# Patient Record
Sex: MALE | Race: WHITE | NOT HISPANIC OR LATINO | Employment: OTHER | URBAN - METROPOLITAN AREA
[De-identification: names, ages, dates, MRNs, and addresses within clinical notes are randomized per-mention and may not be internally consistent; named-entity substitution may affect disease eponyms.]

---

## 2018-04-03 ENCOUNTER — OFFICE VISIT (OUTPATIENT)
Dept: ORTHOPEDICS | Facility: CLINIC | Age: 68
End: 2018-04-03
Payer: MEDICARE

## 2018-04-03 VITALS
BODY MASS INDEX: 21.43 KG/M2 | HEART RATE: 90 BPM | WEIGHT: 167 LBS | DIASTOLIC BLOOD PRESSURE: 78 MMHG | SYSTOLIC BLOOD PRESSURE: 110 MMHG | HEIGHT: 74 IN

## 2018-04-03 DIAGNOSIS — Z96.652 STATUS POST TOTAL LEFT KNEE REPLACEMENT: Primary | ICD-10-CM

## 2018-04-03 PROCEDURE — 99212 OFFICE O/P EST SF 10 MIN: CPT | Mod: ,,, | Performed by: ORTHOPAEDIC SURGERY

## 2018-04-03 RX ORDER — TAMSULOSIN HYDROCHLORIDE 0.4 MG/1
1 CAPSULE ORAL NIGHTLY
COMMUNITY
Start: 2018-03-24

## 2018-04-03 RX ORDER — FINASTERIDE 5 MG/1
1 TABLET, FILM COATED ORAL NIGHTLY
COMMUNITY
Start: 2018-01-15

## 2018-04-03 RX ORDER — VENLAFAXINE HYDROCHLORIDE 150 MG/1
1 CAPSULE, EXTENDED RELEASE ORAL DAILY
COMMUNITY
Start: 2018-01-05

## 2018-04-03 RX ORDER — ROSUVASTATIN CALCIUM 10 MG/1
1 TABLET, COATED ORAL DAILY
COMMUNITY
Start: 2018-02-24

## 2018-04-03 NOTE — PROGRESS NOTES
Subjective:       Chief Complaint    Chief Complaint   Patient presents with    Follow-up     left knee.  DOS: 11/10/14, left TKA.  Patient reports no problems at this time.  One morning he over stretched his legs by accident but everything was ok.  He was questions about the implants in both of his knees due to the lawsuit commericals on T.V. about them coming dislodged.       HPI  Tal Vela is a 67 y.o.  male who presents Follow-up left total knee arthroplasty.      Past History  Past Medical History:   Diagnosis Date    BPH (benign prostatic hyperplasia)     Chondrocalcinosis of knee     Degenerative arthritis of left knee     Degenerative arthritis of left wrist     Soft tissue tumor     Tenosynovitis, wrist      Past Surgical History:   Procedure Laterality Date    ELBOW SURGERY      pinning (in high school)    TOTAL KNEE ARTHROPLASTY Left 11/10/2014    TOTAL KNEE ARTHROPLASTY Right 2006     Social History     Social History    Marital status:      Spouse name: N/A    Number of children: N/A    Years of education: N/A     Occupational History    Not on file.     Social History Main Topics    Smoking status: Current Every Day Smoker    Smokeless tobacco: Never Used    Alcohol use No    Drug use: No    Sexual activity: Not on file     Other Topics Concern    Not on file     Social History Narrative    No narrative on file         Medications  Current Outpatient Prescriptions   Medication Sig    finasteride (PROSCAR) 5 mg tablet Take 1 tablet by mouth once daily.    rosuvastatin (CRESTOR) 10 MG tablet Take 1 tablet by mouth once daily.    tamsulosin (FLOMAX) 0.4 mg Cp24 Take 1 capsule by mouth once daily.    venlafaxine (EFFEXOR-XR) 150 MG Cp24 Take 1 capsule by mouth once daily.     No current facility-administered medications for this visit.        Allergies  Review of patient's allergies indicates:   Allergen Reactions    Iodine and iodide containing products Anaphylaxis          Review of Systems     Constitutional: Negative    HENT: Negative  Eyes: Negative  Respiratory: Negative  Cardiovascular: Negative  Musculoskeletal: HPI  Skin: Negative  Neurological: Negative  Hematological: Negative  Endocrine: Negative      Physical Exam    Vitals:    04/03/18 0811   BP: 110/78   Pulse: 90     Physical Examination:Range of motion left knee 0-132°. Knee is stable well aligned. No swelling, nontender.     Skin-  General appearance -  well appearing, and in no distress  Mental status - awake  Neck - supple  Chest -  symmetric air entry  Heart - normal rate   Abdomen - soft      Assessment/Plan   Status post total left knee replacement      Patient doing very well. Advised to follow-up in a year. Continue with his core exercises.      This note was dictated using voice recognition software and may contain grammatical errors.

## 2019-07-11 ENCOUNTER — TELEPHONE (OUTPATIENT)
Dept: SMOKING CESSATION | Facility: CLINIC | Age: 69
End: 2019-07-11

## 2019-07-11 NOTE — TELEPHONE ENCOUNTER
I called patient regarding referral to the Smoking Cessation Program received by Dr. Zimmerman. I was unable to reach the patient, so I left a message with my contact information, as well as our  office number.

## 2019-08-13 DIAGNOSIS — R07.9 CHEST PAIN, UNSPECIFIED: ICD-10-CM

## 2019-08-13 DIAGNOSIS — R06.02 SHORTNESS OF BREATH: ICD-10-CM

## 2019-08-13 DIAGNOSIS — Z82.49 FAMILY HISTORY OF ISCHEMIC HEART DISEASE: ICD-10-CM

## 2019-08-13 DIAGNOSIS — Z13.6 SCREENING FOR ISCHEMIC HEART DISEASE: Primary | ICD-10-CM

## 2019-08-19 ENCOUNTER — HOSPITAL ENCOUNTER (OUTPATIENT)
Dept: RADIOLOGY | Facility: HOSPITAL | Age: 69
Discharge: HOME | End: 2019-08-19
Attending: INTERNAL MEDICINE
Payer: MEDICARE

## 2019-08-19 DIAGNOSIS — Z82.49 FAMILY HISTORY OF ISCHEMIC HEART DISEASE: ICD-10-CM

## 2019-08-19 DIAGNOSIS — Z13.6 SCREENING FOR ISCHEMIC HEART DISEASE: ICD-10-CM

## 2019-08-19 DIAGNOSIS — R07.9 CHEST PAIN, UNSPECIFIED: ICD-10-CM

## 2019-08-19 DIAGNOSIS — R06.02 SHORTNESS OF BREATH: ICD-10-CM

## 2019-08-19 PROCEDURE — 76706 US ABDL AORTA SCREEN AAA: CPT | Mod: TC

## 2019-10-08 DIAGNOSIS — Z82.49 FH: ANEURYSM: ICD-10-CM

## 2019-10-08 DIAGNOSIS — R51.9 HEAD ACHE: Primary | ICD-10-CM

## 2019-10-10 ENCOUNTER — HOSPITAL ENCOUNTER (OUTPATIENT)
Dept: RADIOLOGY | Facility: HOSPITAL | Age: 69
Discharge: HOME | End: 2019-10-10
Attending: NURSE PRACTITIONER
Payer: MEDICARE

## 2019-10-10 DIAGNOSIS — R51.9 HEAD ACHE: ICD-10-CM

## 2019-10-10 DIAGNOSIS — Z82.49 FH: ANEURYSM: ICD-10-CM

## 2019-10-10 PROCEDURE — 70551 MRI BRAIN STEM W/O DYE: CPT | Mod: TC,PO

## 2020-01-03 ENCOUNTER — HOSPITAL ENCOUNTER (EMERGENCY)
Facility: HOSPITAL | Age: 70
Discharge: HOME | End: 2020-01-04
Attending: EMERGENCY MEDICINE
Payer: MEDICARE

## 2020-01-03 DIAGNOSIS — S01.81XA FACIAL LACERATION, INITIAL ENCOUNTER: Primary | ICD-10-CM

## 2020-01-03 DIAGNOSIS — R06.02 SHORTNESS OF BREATH: ICD-10-CM

## 2020-01-03 DIAGNOSIS — R55 SYNCOPE: ICD-10-CM

## 2020-01-03 LAB
ALBUMIN SERPL BCP-MCNC: 3.9 G/DL (ref 3.5–5.2)
ALP SERPL-CCNC: 17 U/L (ref 55–135)
ALT SERPL W/O P-5'-P-CCNC: 20 U/L (ref 10–44)
ANION GAP SERPL CALC-SCNC: 12 MMOL/L (ref 8–16)
AST SERPL-CCNC: 23 U/L (ref 10–40)
BASOPHILS # BLD AUTO: 0.02 K/UL (ref 0–0.2)
BASOPHILS NFR BLD: 0.2 % (ref 0–1.9)
BILIRUB SERPL-MCNC: 0.4 MG/DL (ref 0.1–1)
BNP SERPL-MCNC: 34 PG/ML (ref 0–99)
BUN SERPL-MCNC: 14 MG/DL (ref 8–23)
CALCIUM SERPL-MCNC: 9.3 MG/DL (ref 8.7–10.5)
CHLORIDE SERPL-SCNC: 102 MMOL/L (ref 95–110)
CO2 SERPL-SCNC: 27 MMOL/L (ref 23–29)
CREAT SERPL-MCNC: 1.1 MG/DL (ref 0.5–1.4)
DIFFERENTIAL METHOD: ABNORMAL
EOSINOPHIL # BLD AUTO: 0 K/UL (ref 0–0.5)
EOSINOPHIL NFR BLD: 0 % (ref 0–8)
ERYTHROCYTE [DISTWIDTH] IN BLOOD BY AUTOMATED COUNT: 13.1 % (ref 11.5–14.5)
EST. GFR  (AFRICAN AMERICAN): >60 ML/MIN/1.73 M^2
EST. GFR  (NON AFRICAN AMERICAN): >60 ML/MIN/1.73 M^2
GLUCOSE SERPL-MCNC: 112 MG/DL (ref 70–110)
HCT VFR BLD AUTO: 39.1 % (ref 40–54)
HGB BLD-MCNC: 13.5 G/DL (ref 14–18)
IMM GRANULOCYTES # BLD AUTO: 0.04 K/UL (ref 0–0.04)
IMM GRANULOCYTES NFR BLD AUTO: 0.3 % (ref 0–0.5)
INR PPP: 1
LYMPHOCYTES # BLD AUTO: 1.8 K/UL (ref 1–4.8)
LYMPHOCYTES NFR BLD: 15.5 % (ref 18–48)
MCH RBC QN AUTO: 31.7 PG (ref 27–31)
MCHC RBC AUTO-ENTMCNC: 34.5 G/DL (ref 32–36)
MCV RBC AUTO: 92 FL (ref 82–98)
MONOCYTES # BLD AUTO: 1 K/UL (ref 0.3–1)
MONOCYTES NFR BLD: 8.7 % (ref 4–15)
NEUTROPHILS # BLD AUTO: 8.8 K/UL (ref 1.8–7.7)
NEUTROPHILS NFR BLD: 75.3 % (ref 38–73)
NRBC BLD-RTO: 0 /100 WBC
PLATELET # BLD AUTO: 301 K/UL (ref 150–350)
PMV BLD AUTO: 9.2 FL (ref 9.2–12.9)
POTASSIUM SERPL-SCNC: 3.9 MMOL/L (ref 3.5–5.1)
PROT SERPL-MCNC: 7.4 G/DL (ref 6–8.4)
PROTHROMBIN TIME: 12.4 SEC (ref 10.6–14.8)
RBC # BLD AUTO: 4.26 M/UL (ref 4.6–6.2)
SODIUM SERPL-SCNC: 141 MMOL/L (ref 136–145)
TROPONIN I SERPL DL<=0.01 NG/ML-MCNC: <0.03 NG/ML
WBC # BLD AUTO: 11.73 K/UL (ref 3.9–12.7)

## 2020-01-03 PROCEDURE — 80053 COMPREHEN METABOLIC PANEL: CPT

## 2020-01-03 PROCEDURE — 96372 THER/PROPH/DIAG INJ SC/IM: CPT | Mod: 59

## 2020-01-03 PROCEDURE — 99285 EMERGENCY DEPT VISIT HI MDM: CPT | Mod: 25

## 2020-01-03 PROCEDURE — 85610 PROTHROMBIN TIME: CPT

## 2020-01-03 PROCEDURE — 84484 ASSAY OF TROPONIN QUANT: CPT

## 2020-01-03 PROCEDURE — 83880 ASSAY OF NATRIURETIC PEPTIDE: CPT

## 2020-01-03 PROCEDURE — 93005 ELECTROCARDIOGRAM TRACING: CPT

## 2020-01-03 PROCEDURE — 36415 COLL VENOUS BLD VENIPUNCTURE: CPT

## 2020-01-03 PROCEDURE — 63600175 PHARM REV CODE 636 W HCPCS: Performed by: EMERGENCY MEDICINE

## 2020-01-03 PROCEDURE — 85025 COMPLETE CBC W/AUTO DIFF WBC: CPT

## 2020-01-03 RX ORDER — CEFTRIAXONE 1 G/1
1 INJECTION, POWDER, FOR SOLUTION INTRAMUSCULAR; INTRAVENOUS
Status: COMPLETED | OUTPATIENT
Start: 2020-01-03 | End: 2020-01-03

## 2020-01-03 RX ORDER — DOXYCYCLINE 100 MG/1
100 CAPSULE ORAL 2 TIMES DAILY
Qty: 20 CAPSULE | Refills: 0 | Status: SHIPPED | OUTPATIENT
Start: 2020-01-03 | End: 2020-01-13

## 2020-01-03 RX ADMIN — CEFTRIAXONE SODIUM 1 G: 1 INJECTION, POWDER, FOR SOLUTION INTRAMUSCULAR; INTRAVENOUS at 11:01

## 2020-01-04 VITALS
WEIGHT: 172 LBS | OXYGEN SATURATION: 96 % | TEMPERATURE: 98 F | HEART RATE: 61 BPM | SYSTOLIC BLOOD PRESSURE: 146 MMHG | RESPIRATION RATE: 16 BRPM | HEIGHT: 74 IN | BODY MASS INDEX: 22.07 KG/M2 | DIASTOLIC BLOOD PRESSURE: 62 MMHG

## 2020-01-04 NOTE — ED PROVIDER NOTES
Encounter Date: 1/3/2020       History     Chief Complaint   Patient presents with    Laceration     Blacked out and hit ground, laceration to left ear.     69-year-old male was leaning onto the ground and working under the car and suddenly got up and passed out and hit the left side of the head and sustained laceration on the left ear.  Patient said he passed out before he injured his head.  Patient said he had a syncopal episode once in the past.  Denies any other complaints at this time.  Denies fever chills nausea vomiting or chest pain or shortness of breath. Patient said he felt lightheaded before this episode when he got up suddenly        Review of patient's allergies indicates:   Allergen Reactions    Iodine and iodide containing products Anaphylaxis     Past Medical History:   Diagnosis Date    BPH (benign prostatic hyperplasia)     Chondrocalcinosis of knee     Degenerative arthritis of left knee     Degenerative arthritis of left wrist     Soft tissue tumor     Tenosynovitis, wrist      Past Surgical History:   Procedure Laterality Date    ELBOW SURGERY      pinning (in high school)    TOTAL KNEE ARTHROPLASTY Left 11/10/2014    TOTAL KNEE ARTHROPLASTY Right 2006     Family History   Problem Relation Age of Onset    Lung disease Mother     Lung disease Father      Social History     Tobacco Use    Smoking status: Current Every Day Smoker    Smokeless tobacco: Never Used   Substance Use Topics    Alcohol use: No    Drug use: No     Review of Systems   Constitutional: Negative.    HENT: Negative.    Eyes: Negative.    Respiratory: Negative.    Cardiovascular: Negative.  Negative for chest pain.   Gastrointestinal: Negative.    Endocrine: Negative.    Genitourinary: Negative.    Musculoskeletal: Negative.    Skin: Negative.         Left ear laceration several hours ago this afternoon which is well approximated and closed and not bleeding any at this time   Allergic/Immunologic: Negative.     Neurological: Positive for syncope.   Hematological: Negative.    Psychiatric/Behavioral: Negative.    All other systems reviewed and are negative.      Physical Exam     Initial Vitals [01/03/20 2029]   BP Pulse Resp Temp SpO2   106/69 84 16 98.3 °F (36.8 °C) 96 %      MAP       --         Physical Exam    Nursing note and vitals reviewed.  Constitutional: He appears well-developed and well-nourished.   HENT:   Head: Normocephalic.   Right Ear: External ear normal.   Nose: Nose normal.   Laceration which appears like a skin tear on the left pinna.  Wound is well approximated and closed and not open at this time and well opposed   Eyes: Conjunctivae and EOM are normal.   Neck: Normal range of motion. No tracheal deviation present.   Cardiovascular: Normal rate, regular rhythm, normal heart sounds and intact distal pulses. Exam reveals no friction rub.    No murmur heard.  Pulmonary/Chest: Breath sounds normal. No respiratory distress. He has no wheezes. He has no rales.   Abdominal: Soft. He exhibits no distension. There is no tenderness.   Musculoskeletal: Normal range of motion.   Neurological: He is alert and oriented to person, place, and time. He has normal strength. He displays normal reflexes. No cranial nerve deficit or sensory deficit. GCS score is 15. GCS eye subscore is 4. GCS verbal subscore is 5. GCS motor subscore is 6.   Skin: Skin is warm and dry. Capillary refill takes less than 2 seconds. No pallor.   Psychiatric: He has a normal mood and affect. Thought content normal.         ED Course   Procedures  Labs Reviewed   CBC W/ AUTO DIFFERENTIAL - Abnormal; Notable for the following components:       Result Value    RBC 4.26 (*)     Hemoglobin 13.5 (*)     Hematocrit 39.1 (*)     Mean Corpuscular Hemoglobin 31.7 (*)     Gran # (ANC) 8.8 (*)     Gran% 75.3 (*)     Lymph% 15.5 (*)     All other components within normal limits   COMPREHENSIVE METABOLIC PANEL - Abnormal; Notable for the following  components:    Glucose 112 (*)     Alkaline Phosphatase 17 (*)     All other components within normal limits   TROPONIN I   B-TYPE NATRIURETIC PEPTIDE   PROTIME-INR     EKG Readings: (Independently Interpreted)   Initial Reading: No STEMI. Rhythm: Normal Sinus Rhythm. Ectopy: No Ectopy. Conduction: Normal.       Imaging Results          CT Head Without Contrast (Final result)  Result time 01/03/20 21:07:29    Final result by Lidia Henderson MD (01/03/20 21:07:29)                 Narrative:    All CT scans at this facility used dose modulation, iterative  reconstruction and/or weight-based dosing when appropriate to reduce  radiation doses  as low as reasonably achievable.    CLINICAL INFORMATION:  Syncope/fainting.fell and hit left ear in fall    FINDINGS:   The ventricles and sulci are normal in size and  configuration for age.  There is no intraparenchymal hemorrhage, mass  or midline shift.  There are no extra-axial fluid collections. There  is mucosal thickening in the left maxillary sinus. The remainder the  paranasal sinuses and mastoid air cells are clear.    IMPRESSION:   NO ACUTE INTRACRANIAL PROCESS.    Mucosal thickening in the left maxillary sinus.    Electronically Signed by Lidia Henderson M.D. on 1/3/2020 10:13 PM                            X-Rays:   Independently Interpreted Readings:   Other Readings:  Chest x-ray within normal limits.  Head CT unremarkable.    Medical Decision Making:   Differential Diagnosis:   69-year-old male presented emergency department after a syncopal episode.  Patient has a laceration on the left ear which is well approximated.  Wound cleaned and irrigated and dressed.  Repair not needed as skin well approximated and it was over 10 hr since the injury happened and is not actively bleeding.  Patient is asymptomatic at this time.  Admission offered however patient adamantly refusing to be admitted.  Patient also having cough and symptoms of bronchitis and will use  antibiotics to cover for that and to prevent ear infection.  Patient leaving against medical advice at this time.  Clinical Tests:   Lab Tests: Reviewed  Radiological Study: Reviewed  Medical Tests: Reviewed  ED Management:  Patient understands the risks and complications of leaving against medical advice.  Left ear irrigated and cleaned and dressed                                 Clinical Impression:       ICD-10-CM ICD-9-CM   1. Facial laceration, initial encounter S01.81XA 873.40   2. Syncope R55 780.2   3. Shortness of breath R06.02 786.05                             Edd Christianson MD  01/03/20 5226

## 2020-01-04 NOTE — DISCHARGE INSTRUCTIONS
You are leaving against medical advice.  Return to emergency department for worsening symptoms or any problems.  You have laceration of you ear follow-up with her primary care provider for recheck in 2 days

## 2020-06-04 DIAGNOSIS — F17.210 CIGARETTE SMOKER: Primary | ICD-10-CM

## 2020-06-08 ENCOUNTER — HOSPITAL ENCOUNTER (OUTPATIENT)
Dept: RADIOLOGY | Facility: HOSPITAL | Age: 70
Discharge: HOME | End: 2020-06-08
Attending: INTERNAL MEDICINE
Payer: MEDICARE

## 2020-06-08 DIAGNOSIS — F17.210 CIGARETTE SMOKER: ICD-10-CM

## 2020-06-08 PROCEDURE — G0297 LDCT FOR LUNG CA SCREEN: HCPCS | Mod: TC,PO

## 2020-10-15 DIAGNOSIS — G43.009 MIGRAINE WITHOUT AURA AND WITHOUT STATUS MIGRAINOSUS, NOT INTRACTABLE: Primary | ICD-10-CM

## 2020-10-22 ENCOUNTER — HOSPITAL ENCOUNTER (OUTPATIENT)
Dept: RADIOLOGY | Facility: HOSPITAL | Age: 70
Discharge: HOME | End: 2020-10-22
Attending: FAMILY MEDICINE
Payer: MEDICARE

## 2020-10-22 DIAGNOSIS — G43.009 MIGRAINE WITHOUT AURA AND WITHOUT STATUS MIGRAINOSUS, NOT INTRACTABLE: ICD-10-CM

## 2020-10-22 LAB
CREAT SERPL-MCNC: 1.1 MG/DL (ref 0.5–1.4)
SAMPLE: NORMAL

## 2020-10-22 PROCEDURE — 70496 CT ANGIOGRAPHY HEAD: CPT | Mod: TC,PO

## 2020-10-22 PROCEDURE — 25500020 PHARM REV CODE 255: Mod: PO

## 2020-10-22 RX ADMIN — IOHEXOL 100 ML: 350 INJECTION, SOLUTION INTRAVENOUS at 08:10

## 2021-02-04 ENCOUNTER — TELEPHONE (OUTPATIENT)
Dept: CARDIOLOGY | Facility: CLINIC | Age: 71
End: 2021-02-04

## 2021-03-09 ENCOUNTER — IMMUNIZATION (OUTPATIENT)
Dept: PRIMARY CARE CLINIC | Facility: CLINIC | Age: 71
End: 2021-03-09
Payer: MEDICARE

## 2021-03-09 DIAGNOSIS — Z23 NEED FOR VACCINATION: Primary | ICD-10-CM

## 2021-03-09 PROCEDURE — 0001A COVID-19, MRNA, LNP-S, PF, 30 MCG/0.3 ML DOSE VACCINE: ICD-10-PCS | Mod: CV19,S$GLB,, | Performed by: FAMILY MEDICINE

## 2021-03-09 PROCEDURE — 91300 COVID-19, MRNA, LNP-S, PF, 30 MCG/0.3 ML DOSE VACCINE: CPT | Mod: S$GLB,,, | Performed by: FAMILY MEDICINE

## 2021-03-09 PROCEDURE — 91300 COVID-19, MRNA, LNP-S, PF, 30 MCG/0.3 ML DOSE VACCINE: ICD-10-PCS | Mod: S$GLB,,, | Performed by: FAMILY MEDICINE

## 2021-03-09 PROCEDURE — 0001A COVID-19, MRNA, LNP-S, PF, 30 MCG/0.3 ML DOSE VACCINE: CPT | Mod: CV19,S$GLB,, | Performed by: FAMILY MEDICINE

## 2021-03-22 DIAGNOSIS — F17.210 NICOTINE DEPENDENCE, CIGARETTES, UNCOMPLICATED: Primary | ICD-10-CM

## 2021-03-30 ENCOUNTER — HOSPITAL ENCOUNTER (OUTPATIENT)
Dept: PULMONOLOGY | Facility: HOSPITAL | Age: 71
Discharge: HOME | End: 2021-03-30
Attending: INTERNAL MEDICINE
Payer: MEDICARE

## 2021-03-30 ENCOUNTER — HOSPITAL ENCOUNTER (OUTPATIENT)
Dept: RADIOLOGY | Facility: HOSPITAL | Age: 71
Discharge: HOME | End: 2021-03-30
Attending: INTERNAL MEDICINE
Payer: MEDICARE

## 2021-03-30 ENCOUNTER — IMMUNIZATION (OUTPATIENT)
Dept: PRIMARY CARE CLINIC | Facility: CLINIC | Age: 71
End: 2021-03-30
Payer: MEDICARE

## 2021-03-30 DIAGNOSIS — Z23 NEED FOR VACCINATION: Primary | ICD-10-CM

## 2021-03-30 DIAGNOSIS — F17.210 NICOTINE DEPENDENCE, CIGARETTES, UNCOMPLICATED: ICD-10-CM

## 2021-03-30 DIAGNOSIS — R06.02 SHORTNESS OF BREATH: ICD-10-CM

## 2021-03-30 LAB — BR6MWT: NORMAL

## 2021-03-30 PROCEDURE — 94618 PULMONARY STRESS TESTING: ICD-10-PCS | Mod: 26,,, | Performed by: INTERNAL MEDICINE

## 2021-03-30 PROCEDURE — 71271 CT CHEST LUNG SCREENING LOW DOSE: ICD-10-PCS | Mod: 26,,, | Performed by: RADIOLOGY

## 2021-03-30 PROCEDURE — 94618 PULMONARY STRESS TESTING: CPT | Mod: 26,,, | Performed by: INTERNAL MEDICINE

## 2021-03-30 PROCEDURE — 71271 CT THORAX LUNG CANCER SCR C-: CPT | Mod: 26,,, | Performed by: RADIOLOGY

## 2021-03-30 PROCEDURE — 91300 COVID-19, MRNA, LNP-S, PF, 30 MCG/0.3 ML DOSE VACCINE: ICD-10-PCS | Mod: S$GLB,,, | Performed by: FAMILY MEDICINE

## 2021-03-30 PROCEDURE — 0002A COVID-19, MRNA, LNP-S, PF, 30 MCG/0.3 ML DOSE VACCINE: CPT | Mod: CV19,S$GLB,, | Performed by: FAMILY MEDICINE

## 2021-03-30 PROCEDURE — 94618 PULMONARY STRESS TESTING: CPT

## 2021-03-30 PROCEDURE — 91300 COVID-19, MRNA, LNP-S, PF, 30 MCG/0.3 ML DOSE VACCINE: CPT | Mod: S$GLB,,, | Performed by: FAMILY MEDICINE

## 2021-03-30 PROCEDURE — 71271 CT THORAX LUNG CANCER SCR C-: CPT | Mod: TC

## 2021-03-30 PROCEDURE — 0002A COVID-19, MRNA, LNP-S, PF, 30 MCG/0.3 ML DOSE VACCINE: ICD-10-PCS | Mod: CV19,S$GLB,, | Performed by: FAMILY MEDICINE

## 2021-05-13 ENCOUNTER — HOSPITAL ENCOUNTER (OUTPATIENT)
Dept: PULMONOLOGY | Facility: HOSPITAL | Age: 71
Discharge: HOME | End: 2021-05-13
Attending: INTERNAL MEDICINE
Payer: MEDICARE

## 2021-05-13 DIAGNOSIS — R06.02 SHORTNESS OF BREATH: ICD-10-CM

## 2021-05-13 PROCEDURE — 94618 PULMONARY STRESS TESTING: CPT | Mod: 26,,, | Performed by: INTERNAL MEDICINE

## 2021-05-13 PROCEDURE — 94618 PULMONARY STRESS TESTING: ICD-10-PCS | Mod: 26,,, | Performed by: INTERNAL MEDICINE

## 2021-05-13 PROCEDURE — 94618 PULMONARY STRESS TESTING: CPT

## 2021-05-17 LAB — BR6MWT: NORMAL

## 2021-10-27 ENCOUNTER — IMMUNIZATION (OUTPATIENT)
Dept: PRIMARY CARE CLINIC | Facility: CLINIC | Age: 71
End: 2021-10-27
Payer: MEDICARE

## 2021-10-27 DIAGNOSIS — Z23 NEED FOR VACCINATION: Primary | ICD-10-CM

## 2021-10-27 PROCEDURE — 0003A COVID-19, MRNA, LNP-S, PF, 30 MCG/0.3 ML DOSE VACCINE: CPT | Mod: S$GLB,,, | Performed by: FAMILY MEDICINE

## 2021-10-27 PROCEDURE — 0003A COVID-19, MRNA, LNP-S, PF, 30 MCG/0.3 ML DOSE VACCINE: ICD-10-PCS | Mod: S$GLB,,, | Performed by: FAMILY MEDICINE

## 2021-10-27 PROCEDURE — 91300 COVID-19, MRNA, LNP-S, PF, 30 MCG/0.3 ML DOSE VACCINE: ICD-10-PCS | Mod: S$GLB,,, | Performed by: FAMILY MEDICINE

## 2021-10-27 PROCEDURE — 91300 COVID-19, MRNA, LNP-S, PF, 30 MCG/0.3 ML DOSE VACCINE: CPT | Mod: S$GLB,,, | Performed by: FAMILY MEDICINE

## 2022-03-16 DIAGNOSIS — F17.210 CIGARETTE SMOKER: Primary | ICD-10-CM

## 2022-03-21 ENCOUNTER — HOSPITAL ENCOUNTER (OUTPATIENT)
Dept: RADIOLOGY | Facility: HOSPITAL | Age: 72
Discharge: HOME | End: 2022-03-21
Attending: INTERNAL MEDICINE
Payer: MEDICARE

## 2022-03-21 DIAGNOSIS — F17.210 CIGARETTE SMOKER: ICD-10-CM

## 2022-03-21 PROCEDURE — 71271 CT THORAX LUNG CANCER SCR C-: CPT | Mod: TC,PO

## 2022-07-24 ENCOUNTER — HOSPITAL ENCOUNTER (OUTPATIENT)
Facility: HOSPITAL | Age: 72
Discharge: HOME | End: 2022-07-25
Attending: EMERGENCY MEDICINE | Admitting: HOSPITALIST
Payer: MEDICARE

## 2022-07-24 DIAGNOSIS — J44.1 COPD WITH ACUTE EXACERBATION: Primary | ICD-10-CM

## 2022-07-24 DIAGNOSIS — R06.00 DYSPNEA: ICD-10-CM

## 2022-07-24 LAB
ALBUMIN SERPL BCP-MCNC: 3.7 G/DL (ref 3.5–5.2)
ALP SERPL-CCNC: 15 U/L (ref 55–135)
ALT SERPL W/O P-5'-P-CCNC: 23 U/L (ref 10–44)
ANION GAP SERPL CALC-SCNC: 7 MMOL/L (ref 8–16)
AST SERPL-CCNC: 22 U/L (ref 10–40)
BASOPHILS # BLD AUTO: 0.02 K/UL (ref 0–0.2)
BASOPHILS NFR BLD: 0.3 % (ref 0–1.9)
BILIRUB SERPL-MCNC: 0.6 MG/DL (ref 0.1–1)
BNP SERPL-MCNC: 46 PG/ML (ref 0–99)
BUN SERPL-MCNC: 10 MG/DL (ref 8–23)
CALCIUM SERPL-MCNC: 9.2 MG/DL (ref 8.7–10.5)
CHLORIDE SERPL-SCNC: 103 MMOL/L (ref 95–110)
CO2 SERPL-SCNC: 29 MMOL/L (ref 23–29)
CREAT SERPL-MCNC: 0.9 MG/DL (ref 0.5–1.4)
DIFFERENTIAL METHOD: ABNORMAL
EOSINOPHIL # BLD AUTO: 0.2 K/UL (ref 0–0.5)
EOSINOPHIL NFR BLD: 2.6 % (ref 0–8)
ERYTHROCYTE [DISTWIDTH] IN BLOOD BY AUTOMATED COUNT: 13.2 % (ref 11.5–14.5)
EST. GFR  (AFRICAN AMERICAN): >60 ML/MIN/1.73 M^2
EST. GFR  (NON AFRICAN AMERICAN): >60 ML/MIN/1.73 M^2
GLUCOSE SERPL-MCNC: 93 MG/DL (ref 70–110)
HCT VFR BLD AUTO: 39.6 % (ref 40–54)
HGB BLD-MCNC: 13.3 G/DL (ref 14–18)
IMM GRANULOCYTES # BLD AUTO: 0.03 K/UL (ref 0–0.04)
IMM GRANULOCYTES NFR BLD AUTO: 0.4 % (ref 0–0.5)
INR PPP: 1
LYMPHOCYTES # BLD AUTO: 1.6 K/UL (ref 1–4.8)
LYMPHOCYTES NFR BLD: 22.2 % (ref 18–48)
MCH RBC QN AUTO: 30.6 PG (ref 27–31)
MCHC RBC AUTO-ENTMCNC: 33.6 G/DL (ref 32–36)
MCV RBC AUTO: 91 FL (ref 82–98)
MONOCYTES # BLD AUTO: 0.8 K/UL (ref 0.3–1)
MONOCYTES NFR BLD: 11.2 % (ref 4–15)
NEUTROPHILS # BLD AUTO: 4.5 K/UL (ref 1.8–7.7)
NEUTROPHILS NFR BLD: 63.3 % (ref 38–73)
NRBC BLD-RTO: 0 /100 WBC
PLATELET # BLD AUTO: 273 K/UL (ref 150–450)
PMV BLD AUTO: 9.7 FL (ref 9.2–12.9)
POTASSIUM SERPL-SCNC: 3.5 MMOL/L (ref 3.5–5.1)
PROT SERPL-MCNC: 6.9 G/DL (ref 6–8.4)
PROTHROMBIN TIME: 12.7 SEC (ref 11.4–13.7)
RBC # BLD AUTO: 4.35 M/UL (ref 4.6–6.2)
SARS-COV-2 RDRP RESP QL NAA+PROBE: NEGATIVE
SODIUM SERPL-SCNC: 139 MMOL/L (ref 136–145)
TROPONIN I SERPL DL<=0.01 NG/ML-MCNC: <0.03 NG/ML
WBC # BLD AUTO: 7.04 K/UL (ref 3.9–12.7)

## 2022-07-24 PROCEDURE — 94640 AIRWAY INHALATION TREATMENT: CPT

## 2022-07-24 PROCEDURE — 94761 N-INVAS EAR/PLS OXIMETRY MLT: CPT

## 2022-07-24 PROCEDURE — 99900035 HC TECH TIME PER 15 MIN (STAT)

## 2022-07-24 PROCEDURE — 96376 TX/PRO/DX INJ SAME DRUG ADON: CPT

## 2022-07-24 PROCEDURE — 99285 EMERGENCY DEPT VISIT HI MDM: CPT | Mod: 25,CS

## 2022-07-24 PROCEDURE — 85025 COMPLETE CBC W/AUTO DIFF WBC: CPT | Performed by: EMERGENCY MEDICINE

## 2022-07-24 PROCEDURE — 93005 ELECTROCARDIOGRAM TRACING: CPT | Performed by: INTERNAL MEDICINE

## 2022-07-24 PROCEDURE — 63600175 PHARM REV CODE 636 W HCPCS: Performed by: HOSPITALIST

## 2022-07-24 PROCEDURE — 84484 ASSAY OF TROPONIN QUANT: CPT | Performed by: EMERGENCY MEDICINE

## 2022-07-24 PROCEDURE — 96374 THER/PROPH/DIAG INJ IV PUSH: CPT

## 2022-07-24 PROCEDURE — G0378 HOSPITAL OBSERVATION PER HR: HCPCS

## 2022-07-24 PROCEDURE — 25000242 PHARM REV CODE 250 ALT 637 W/ HCPCS: Performed by: EMERGENCY MEDICINE

## 2022-07-24 PROCEDURE — 63600175 PHARM REV CODE 636 W HCPCS: Performed by: EMERGENCY MEDICINE

## 2022-07-24 PROCEDURE — U0002 COVID-19 LAB TEST NON-CDC: HCPCS | Performed by: EMERGENCY MEDICINE

## 2022-07-24 PROCEDURE — 83880 ASSAY OF NATRIURETIC PEPTIDE: CPT | Performed by: EMERGENCY MEDICINE

## 2022-07-24 PROCEDURE — 93010 EKG 12-LEAD: ICD-10-PCS | Mod: ,,, | Performed by: INTERNAL MEDICINE

## 2022-07-24 PROCEDURE — S4991 NICOTINE PATCH NONLEGEND: HCPCS | Performed by: NURSE PRACTITIONER

## 2022-07-24 PROCEDURE — 25000003 PHARM REV CODE 250: Performed by: NURSE PRACTITIONER

## 2022-07-24 PROCEDURE — 99900031 HC PATIENT EDUCATION (STAT)

## 2022-07-24 PROCEDURE — 80053 COMPREHEN METABOLIC PANEL: CPT | Performed by: EMERGENCY MEDICINE

## 2022-07-24 PROCEDURE — 85610 PROTHROMBIN TIME: CPT | Performed by: EMERGENCY MEDICINE

## 2022-07-24 PROCEDURE — 94799 UNLISTED PULMONARY SVC/PX: CPT

## 2022-07-24 PROCEDURE — 93010 ELECTROCARDIOGRAM REPORT: CPT | Mod: ,,, | Performed by: INTERNAL MEDICINE

## 2022-07-24 PROCEDURE — 25000242 PHARM REV CODE 250 ALT 637 W/ HCPCS: Performed by: NURSE PRACTITIONER

## 2022-07-24 RX ORDER — SODIUM CHLORIDE 0.9 % (FLUSH) 0.9 %
10 SYRINGE (ML) INJECTION
Status: DISCONTINUED | OUTPATIENT
Start: 2022-07-24 | End: 2022-07-25 | Stop reason: HOSPADM

## 2022-07-24 RX ORDER — TALC
6 POWDER (GRAM) TOPICAL NIGHTLY PRN
Status: DISCONTINUED | OUTPATIENT
Start: 2022-07-24 | End: 2022-07-25 | Stop reason: HOSPADM

## 2022-07-24 RX ORDER — GUAIFENESIN 600 MG/1
600 TABLET, EXTENDED RELEASE ORAL 2 TIMES DAILY PRN
Status: DISCONTINUED | OUTPATIENT
Start: 2022-07-24 | End: 2022-07-25 | Stop reason: HOSPADM

## 2022-07-24 RX ORDER — FINASTERIDE 5 MG/1
5 TABLET, FILM COATED ORAL NIGHTLY
Status: DISCONTINUED | OUTPATIENT
Start: 2022-07-24 | End: 2022-07-25 | Stop reason: HOSPADM

## 2022-07-24 RX ORDER — TAMSULOSIN HYDROCHLORIDE 0.4 MG/1
1 CAPSULE ORAL NIGHTLY
Status: DISCONTINUED | OUTPATIENT
Start: 2022-07-24 | End: 2022-07-25 | Stop reason: HOSPADM

## 2022-07-24 RX ORDER — PANTOPRAZOLE SODIUM 40 MG/1
40 TABLET, DELAYED RELEASE ORAL DAILY
Status: DISCONTINUED | OUTPATIENT
Start: 2022-07-25 | End: 2022-07-24

## 2022-07-24 RX ORDER — METHYLPREDNISOLONE SOD SUCC 125 MG
80 VIAL (EA) INJECTION EVERY 8 HOURS
Status: DISCONTINUED | OUTPATIENT
Start: 2022-07-24 | End: 2022-07-25

## 2022-07-24 RX ORDER — HYDROCODONE POLISTIREX AND CHLORPHENIRAMINE POLISTIREX 10; 8 MG/5ML; MG/5ML
5 SUSPENSION, EXTENDED RELEASE ORAL EVERY 12 HOURS PRN
Status: DISCONTINUED | OUTPATIENT
Start: 2022-07-25 | End: 2022-07-25 | Stop reason: HOSPADM

## 2022-07-24 RX ORDER — IPRATROPIUM BROMIDE AND ALBUTEROL SULFATE 2.5; .5 MG/3ML; MG/3ML
3 SOLUTION RESPIRATORY (INHALATION) EVERY 6 HOURS
Status: DISCONTINUED | OUTPATIENT
Start: 2022-07-24 | End: 2022-07-25 | Stop reason: HOSPADM

## 2022-07-24 RX ORDER — IPRATROPIUM BROMIDE AND ALBUTEROL SULFATE 2.5; .5 MG/3ML; MG/3ML
9 SOLUTION RESPIRATORY (INHALATION)
Status: COMPLETED | OUTPATIENT
Start: 2022-07-24 | End: 2022-07-24

## 2022-07-24 RX ORDER — VENLAFAXINE HYDROCHLORIDE 150 MG/1
150 CAPSULE, EXTENDED RELEASE ORAL DAILY
Status: DISCONTINUED | OUTPATIENT
Start: 2022-07-25 | End: 2022-07-25 | Stop reason: HOSPADM

## 2022-07-24 RX ORDER — FAMOTIDINE 20 MG/1
20 TABLET, FILM COATED ORAL 2 TIMES DAILY
Status: DISCONTINUED | OUTPATIENT
Start: 2022-07-24 | End: 2022-07-25 | Stop reason: HOSPADM

## 2022-07-24 RX ORDER — METHYLPREDNISOLONE SOD SUCC 125 MG
125 VIAL (EA) INJECTION
Status: COMPLETED | OUTPATIENT
Start: 2022-07-24 | End: 2022-07-24

## 2022-07-24 RX ORDER — BENZONATATE 100 MG/1
200 CAPSULE ORAL EVERY 12 HOURS PRN
Status: DISCONTINUED | OUTPATIENT
Start: 2022-07-24 | End: 2022-07-25 | Stop reason: HOSPADM

## 2022-07-24 RX ORDER — AMLODIPINE BESYLATE 2.5 MG/1
2.5 TABLET ORAL NIGHTLY
Status: DISCONTINUED | OUTPATIENT
Start: 2022-07-24 | End: 2022-07-25 | Stop reason: HOSPADM

## 2022-07-24 RX ORDER — IBUPROFEN 200 MG
1 TABLET ORAL DAILY
Status: DISCONTINUED | OUTPATIENT
Start: 2022-07-24 | End: 2022-07-25 | Stop reason: HOSPADM

## 2022-07-24 RX ORDER — ATORVASTATIN CALCIUM 20 MG/1
20 TABLET, FILM COATED ORAL DAILY
Status: DISCONTINUED | OUTPATIENT
Start: 2022-07-25 | End: 2022-07-25 | Stop reason: HOSPADM

## 2022-07-24 RX ORDER — NITROGLYCERIN 0.4 MG/1
0.4 TABLET SUBLINGUAL EVERY 5 MIN PRN
Status: DISCONTINUED | OUTPATIENT
Start: 2022-07-24 | End: 2022-07-25 | Stop reason: HOSPADM

## 2022-07-24 RX ADMIN — TAMSULOSIN HYDROCHLORIDE 0.4 MG: 0.4 CAPSULE ORAL at 08:07

## 2022-07-24 RX ADMIN — BENZONATATE 200 MG: 100 CAPSULE ORAL at 08:07

## 2022-07-24 RX ADMIN — IPRATROPIUM BROMIDE AND ALBUTEROL SULFATE 3 ML: .5; 3 SOLUTION RESPIRATORY (INHALATION) at 07:07

## 2022-07-24 RX ADMIN — IPRATROPIUM BROMIDE AND ALBUTEROL SULFATE 9 ML: .5; 3 SOLUTION RESPIRATORY (INHALATION) at 02:07

## 2022-07-24 RX ADMIN — FINASTERIDE 5 MG: 5 TABLET, FILM COATED ORAL at 08:07

## 2022-07-24 RX ADMIN — METHYLPREDNISOLONE SODIUM SUCCINATE 80 MG: 125 INJECTION, POWDER, FOR SOLUTION INTRAMUSCULAR; INTRAVENOUS at 11:07

## 2022-07-24 RX ADMIN — NICOTINE 1 PATCH: 21 PATCH, EXTENDED RELEASE TRANSDERMAL at 08:07

## 2022-07-24 RX ADMIN — METHYLPREDNISOLONE SODIUM SUCCINATE 125 MG: 125 INJECTION, POWDER, FOR SOLUTION INTRAMUSCULAR; INTRAVENOUS at 02:07

## 2022-07-24 RX ADMIN — AMLODIPINE BESYLATE 2.5 MG: 2.5 TABLET ORAL at 08:07

## 2022-07-24 RX ADMIN — FAMOTIDINE 20 MG: 20 TABLET ORAL at 08:07

## 2022-07-24 NOTE — ED PROVIDER NOTES
Encounter Date: 7/24/2022       History     Chief Complaint   Patient presents with    Shortness of Breath     CHRONICALLY., WORSE LAST 3 DAYS    Cough     71-year-old male who has a history of arthritis, COPD who continues to smoke, complaints of shortness of breath is worsening over the last 3 days.  The patient denies any complaints of chest pain or fever.  He states he has occasional sputum production which is generally clear in appearance.  He has not had any abdominal pain nausea vomiting.  No diarrhea.  He denies any dysuria but does have nocturia.  He is using albuterol at home.  Patient did admit that he took 20 mg of prednisone last night.  No history of CHF.  His pulmonologist is Dr. Ontiveros and cardiologist Dr. Lawrence.        Review of patient's allergies indicates:   Allergen Reactions    Iodine and iodide containing products Anaphylaxis    Shellfish containing products Anaphylaxis    Doxycycline Other (See Comments)     headache     Past Medical History:   Diagnosis Date    BPH (benign prostatic hyperplasia)     Chondrocalcinosis of knee     Degenerative arthritis of left knee     Degenerative arthritis of left wrist     Oxygen dependent     2L    Soft tissue tumor     Tenosynovitis, wrist      Past Surgical History:   Procedure Laterality Date    ELBOW SURGERY      pinning (in high school)    TOTAL KNEE ARTHROPLASTY Left 11/10/2014    TOTAL KNEE ARTHROPLASTY Right 2006     Family History   Problem Relation Age of Onset    Lung disease Mother     Lung disease Father      Social History     Tobacco Use    Smoking status: Current Every Day Smoker     Packs/day: 1.00    Smokeless tobacco: Never Used   Substance Use Topics    Alcohol use: No    Drug use: No     Review of Systems   Constitutional: Positive for activity change. Negative for chills, diaphoresis and fever.   HENT: Negative for congestion, rhinorrhea, sinus pressure, sinus pain, sore throat and trouble swallowing.     Respiratory: Positive for cough, shortness of breath and wheezing.    Cardiovascular: Negative for chest pain, palpitations and leg swelling.   Gastrointestinal: Negative for abdominal pain, constipation, diarrhea, nausea and vomiting.   Genitourinary: Negative for difficulty urinating.   Musculoskeletal: Negative for back pain.   Skin: Negative.  Negative for pallor, rash and wound.   Neurological: Negative.    All other systems reviewed and are negative.      Physical Exam     Initial Vitals [07/24/22 1241]   BP Pulse Resp Temp SpO2   120/83 93 (!) 22 98.6 °F (37 °C) (!) 93 %      MAP       --         Physical Exam    Vitals reviewed.  Constitutional: He appears well-developed and well-nourished. He is not diaphoretic. No distress.   HENT:   Head: Normocephalic and atraumatic.   Right Ear: External ear normal.   Left Ear: External ear normal.   Nose: Nose normal.   Mouth/Throat: Oropharynx is clear and moist.   Eyes: Conjunctivae and EOM are normal. Pupils are equal, round, and reactive to light.   Neck: Neck supple. No JVD present.   Normal range of motion.  Cardiovascular: Normal rate, regular rhythm, normal heart sounds and intact distal pulses. Exam reveals no gallop and no friction rub.    No murmur heard.  Pulmonary/Chest: No respiratory distress. He has wheezes. He has no rhonchi. He has no rales. He exhibits no tenderness.   Very poor air excursion.  Expiratory wheezes present.   Abdominal: Abdomen is soft. Bowel sounds are normal. He exhibits no distension and no mass. There is no abdominal tenderness. There is no rebound and no guarding.   Musculoskeletal:         General: No tenderness or edema. Normal range of motion.      Cervical back: Normal range of motion and neck supple.     Lymphadenopathy:     He has no cervical adenopathy.   Neurological: He is alert and oriented to person, place, and time. He has normal strength. No cranial nerve deficit or sensory deficit. GCS score is 15. GCS eye subscore  is 4. GCS verbal subscore is 5. GCS motor subscore is 6.   Skin: Skin is warm and dry. Capillary refill takes less than 2 seconds. No rash noted. No erythema. No pallor.   Psychiatric: He has a normal mood and affect. His behavior is normal. Judgment and thought content normal.         ED Course   Procedures  Labs Reviewed   CBC W/ AUTO DIFFERENTIAL - Abnormal; Notable for the following components:       Result Value    RBC 4.35 (*)     Hemoglobin 13.3 (*)     Hematocrit 39.6 (*)     All other components within normal limits   COMPREHENSIVE METABOLIC PANEL - Abnormal; Notable for the following components:    Alkaline Phosphatase 15 (*)     Anion Gap 7 (*)     All other components within normal limits   TROPONIN I   B-TYPE NATRIURETIC PEPTIDE   PROTIME-INR   SARS-COV-2 RNA AMPLIFICATION, QUAL          Imaging Results          X-Ray Chest AP Portable (Final result)  Result time 07/24/22 13:33:23    Final result by Denzel San MD (07/24/22 13:33:23)                 Narrative:    Chest single view    CLINICAL DATA: Cough, shortness of breath    FINDINGS: PA and lateral views are compared to January 2020.    Heart size is normal. The mediastinum is unremarkable. The lungs are hyperinflated compatible with COPD. No acute infiltrate or pleural effusion is identified.    IMPRESSION:  1. Pulmonary hyperinflation compatible with COPD. No acute abnormalities are identified.    Electronically signed by:  Denzel San MD  7/24/2022 1:33 PM CDT Workstation: [id number]                               Medications   methylPREDNISolone sodium succinate injection 125 mg (125 mg Intravenous Given 7/24/22 0814)   albuterol-ipratropium 2.5 mg-0.5 mg/3 mL nebulizer solution 9 mL (9 mLs Nebulization Given 7/24/22 8601)                Attending Attestation:             Attending ED Notes:   71-year-old male has a history of COPD presented with complaints of shortness of breath and wheezing for the last 3 days.  The patient's  oxygen saturation on presentation was 97%.  The patient when chest was auscultated had very poor air movement and significant expiratory wheezing.  The patient's chest x-ray just shows COPD changes but no infiltrate.  Screening labs obtained were reviewed CBC chemistries unremarkable.  COVID screen is negative.  BNP is normal.  During the ED course he received 3 DuoNeb treatments and 125 mg of Solu-Medrol.  The patient's peak flow was 180. With his her persisting wheezing of patient will therefore require hospitalization.  He will be admitted to [facility] floor.                 Clinical Impression:   Final diagnoses:  [R06.00] Dyspnea  [J44.1] COPD with acute exacerbation (Primary)          ED Disposition Condition    Observation               Femi Berg Jr., MD  07/24/22 0273

## 2022-07-24 NOTE — H&P
[facility] Medicine History & Physical Examination   Patient Name: Tal Vela  MRN: [id number]  Patient Class: Emergency   Admission Date: 7/24/2022 12:38 PM  Length of Stay: 0  Attending Physician:   Primary Care Provider: Dara Kemp MD  Face-to-Face encounter date: 07/24/2022  Code Status:Full Code  MPOA:  Chief Complaint: Shortness of Breath (CHRONICALLY., WORSE LAST 3 DAYS) and Cough        Patient information was obtained from patient, past medical records and ER records.   HISTORY OF PRESENT ILLNESS:   Tal Vela is a 71 y.o. old male who  has a past medical history of BPH (benign prostatic hyperplasia), Chondrocalcinosis of knee, Degenerative arthritis of left knee, Degenerative arthritis of left wrist, Oxygen dependent, Soft tissue tumor, and Tenosynovitis, wrist.. The [facility]/2022 with a primary complaint of Shortness of Breath (CHRONICALLY., WORSE LAST 3 DAYS) and Cough  .   71-year-old  male presents emergency room with shortness of breath and coughing    Past medical history significant for COPD on home oxygen, continued tobacco abuse, coronary artery disease, hypertension and BPH    The patient states over the past 3 days he has been having increased shortness of breath and dyspnea.  The patient states he is on home oxygen but he has required increasing L of oxygen he went from 2 L to 6 L of oxygen in the past 3 days.  He endorse productive cough that is clear in color and then in consistency.    He denies fever chills chest pain palpitations or syncope he did endorse some dizziness with position change.  He denied any trauma he describes his symptoms as severe severity with no exacerbating or alleviating factors    He continues to smoke cigarettes about half a pack a day and he lives with his wife who smoke cigarettes so he is exposed to secondhand cigarettes as well    He did state that he is ready  to quit cigarettes today    REVIEW OF SYSTEMS:   10 Point Review of System was performed and was found to be negative except for that mentioned already in the HPI and   Review of Systems (Negative unless checked off)  Review of Systems   Constitutional: Positive for malaise/fatigue.   HENT: Negative.    Eyes: Negative.    Respiratory: Positive for cough, sputum production and shortness of breath.    Cardiovascular: Negative.    Gastrointestinal: Negative.    Genitourinary: Negative.    Musculoskeletal: Negative.    Skin: Negative.    Neurological: Positive for dizziness.   Endo/Heme/Allergies: Negative.    Psychiatric/Behavioral: Negative.            PAST MEDICAL HISTORY:     Past Medical History:   Diagnosis Date    BPH (benign prostatic hyperplasia)     Chondrocalcinosis of knee     Degenerative arthritis of left knee     Degenerative arthritis of left wrist     Oxygen dependent     2L    Soft tissue tumor     Tenosynovitis, wrist        PAST SURGICAL HISTORY:     Past Surgical History:   Procedure Laterality Date    ELBOW SURGERY      pinning (in high school)    TOTAL KNEE ARTHROPLASTY Left 11/10/2014    TOTAL KNEE ARTHROPLASTY Right 2006       ALLERGIES:   Iodine and iodide containing products, Shellfish containing products, and Doxycycline    FAMILY HISTORY:     Family History   Problem Relation Age of Onset    Lung disease Mother     Lung disease Father        SOCIAL HISTORY:     Social History     Tobacco Use    Smoking status: Current Every Day Smoker     Packs/day: 1.00    Smokeless tobacco: Never Used   Substance Use Topics    Alcohol use: No        Social History     Substance and Sexual Activity   Sexual Activity Not on file        HOME MEDICATIONS:     Prior to Admission medications    Medication Sig Start Date End Date Taking? Authorizing Provider   amLODIPine (NORVASC) 2.5 MG tablet Take 2.5 mg by mouth once daily. 7/1/21  Yes Historical Provider   benzonatate (TESSALON) 200 MG capsule  "Take 200 mg by mouth every 12 (twelve) hours as needed. 6/30/21  Yes Historical Provider   BREZTRI AEROSPHERE 160-9-4.8 mcg/actuation HFAA Inhale 1 puff into the lungs 2 (two) times a day. 10/1/21  Yes Historical Provider   cholecalciferol, vitamin D3, (VITAMIN D3) 25 mcg (1,000 unit) capsule Take 1,000 Units by mouth once daily.   Yes Historical Provider   cyanocobalamin (VITAMIN B-12) 1000 MCG tablet Take 100 mcg by mouth once daily.   Yes Historical Provider   finasteride (PROSCAR) 5 mg tablet Take 1 tablet by mouth once daily. 1/15/18  Yes Historical Provider   ketorolac (TORADOL) 10 mg tablet Take 10 mg by mouth every 6 (six) hours. 7/1/21  Yes Historical Provider   mecobalamin, vitamin B12, 1,000 mcg Chew Take 1 tablet by mouth once daily.   Yes Historical Provider   pantoprazole (PROTONIX) 40 MG tablet Take 40 mg by mouth once daily. 7/1/21  Yes Historical Provider   predniSONE (DELTASONE) 20 MG tablet Take 20 mg by mouth once daily. For flare-up 10/7/21  Yes Historical Provider   rosuvastatin (CRESTOR) 10 MG tablet Take 1 tablet by mouth once daily. 2/24/18  Yes Historical Provider   tamsulosin (FLOMAX) 0.4 mg Cp24 Take 1 capsule by mouth once daily. 3/24/18  Yes Historical Provider   venlafaxine (EFFEXOR-XR) 150 MG Cp24 Take 1 capsule by mouth once daily. 1/5/18  Yes Historical Provider   vitamin K2 100 mcg Cap Take 1 capsule by mouth once daily.   Yes Historical Provider   AEROCHAMBER PLUS FLOW-VU  5/4/21   Historical Provider   fluticasone-umeclidin-vilanter (TRELEGY ELLIPTA) 100-62.5-25 mcg DsDv Inhale 1 puff into the lungs once daily.    Historical Provider   nitroGLYCERIN (NITROSTAT) 0.4 MG SL tablet Place 0.4 mg under the tongue every 5 (five) minutes as needed. 2/16/21   Historical Provider         PHYSICAL EXAM:   /79   Pulse 83   Temp 98.6 °F (37 °C) (Oral)   Resp 18   Ht 6' 2" (1.88 m)   Wt 78.9 kg (174 lb)   SpO2 97%   BMI 22.34 kg/m²   Vitals Reviewed  General appearance:  Fatigued " appearing male in no apparent distress.  Skin: No Rash.   Neuro: Motor and sensory exams grossly intact. Good tone. Power in all 4 extremities 5/5.   HENT: Atraumatic head. Moist mucous membranes of oral cavity.  Eyes: Normal extraocular movements.   Neck: Supple. No evidence of lymphadenopathy. No thyroidomegaly.  Lungs:  Moderate tachypnea, rhonchi and inspiratory expiratory wheezing present.   Heart: Regular rate and rhythm. S1 and S2 present with no murmurs/gallop/rub. No pedal edema. No JVD present.   Abdomen: Soft, non-distended, non-tender. No rebound tenderness/guarding. No masses or organomegaly. Bowel sounds are normal. Bladder is not palpable.   Extremities: No cyanosis, clubbing, or edema.  Psych/mental status: Alert and oriented. Cooperative. Responds appropriately to questions.   EMERGENCY DEPARTMENT LABS AND IMAGING:   Following labs were Reviewed   Recent Labs   Lab 07/24/22  1259   WBC 7.04   HGB 13.3*   HCT 39.6*      CALCIUM 9.2   ALBUMIN 3.7   PROT 6.9      K 3.5   CO2 29      BUN 10   CREATININE 0.9   ALKPHOS 15*   ALT 23   AST 22   BILITOT 0.6         BMP:   Recent Labs   Lab 07/24/22  1259   GLU 93      K 3.5      CO2 29   BUN 10   CREATININE 0.9   CALCIUM 9.2   , CMP   Recent Labs   Lab 07/24/22  1259      K 3.5      CO2 29   GLU 93   BUN 10   CREATININE 0.9   CALCIUM 9.2   PROT 6.9   ALBUMIN 3.7   BILITOT 0.6   ALKPHOS 15*   AST 22   ALT 23   ANIONGAP 7*   ESTGFRAFRICA >60.0   EGFRNONAA >60.0   , CBC   Recent Labs   Lab 07/24/22  1259   WBC 7.04   HGB 13.3*   HCT 39.6*      , INR   Lab Results   Component Value Date    INR 1.0 07/24/2022    INR 1.0 01/03/2020   , Lipid Panel No results found for: CHOL, HDL, LDLCALC, TRIG, CHOLHDL, Troponin   Recent Labs   Lab 07/24/22  1259   TROPONINI <0.030   , A1C:   Recent Labs   Lab 07/05/22  0817   HGBA1C 5.9*    and All labs within the past 24 hours have been reviewed  Microbiology Results (last 7  days)     ** No results found for the last 168 hours. **        X-Ray Chest AP Portable   Final Result        X-Ray Chest AP Portable    Result Date: 7/24/2022  Chest single view CLINICAL DATA: Cough, shortness of breath FINDINGS: PA and lateral views are compared to January 2020. Heart size is normal. The mediastinum is unremarkable. The lungs are hyperinflated compatible with COPD. No acute infiltrate or pleural effusion is identified. IMPRESSION: 1. Pulmonary hyperinflation compatible with COPD. No acute abnormalities are identified. Electronically signed by:  Denzel San MD  7/24/2022 1:33 PM CDT Workstation: [id number]        I personally reviewed and agree with the radiologist's findings    12 eighty-three  milliseconds lead EKG reveals a normal sinus rhythm with a normal axis this good R-wave progression this no significant ST or T-wave abnormalities rate  ASSESSMENT & PLAN:   Tal Vela is a 71 y.o. male admitted for    1. COPD exacerbation  -continuous pulse oximetry monitoring   -supplemental O2  - keep sats >93%.   Started Duo-nebs   + IV steroids  - Mucinex DM   - IS     2. Tobacco use  -cessation discussed and encouraged patient verbalized and understanding  -Nicoderm patch    3. Essential hypertension  -continue home medications to manage    4. Hyperlipidemia  -LFT stable continue statin          DVT Prophylaxis: will be placed on Heparin/Lovenox for DVT prophylaxis and will be advised to be as mobile as possible and sit in a chair as tolerated.   ________________________________________________________________________________    Discharge Planning and Disposition: No mobility needs. Ambulating well. Good social support system. Patient will be discharged in   Face-to-Face encounter date: 07/24/2022  Encounter included review of the medical records, interviewing and examining the patient face-to-face, discussion with family and other health care providers including emergency medicine  physician, admission orders, interpreting lab/test results and formulating a plan of care.   Medical Decision Making during this encounter was  [_] Low Complexity  [_] Moderate Complexity  [x] High Complexity  _________________________________________________________________________________    INPATIENT LIST OF MEDICATIONS   No current facility-administered medications for this encounter.    Current Outpatient Medications:     amLODIPine (NORVASC) 2.5 MG tablet, Take 2.5 mg by mouth once daily., Disp: , Rfl:     benzonatate (TESSALON) 200 MG capsule, Take 200 mg by mouth every 12 (twelve) hours as needed., Disp: , Rfl:     BREZTRI AEROSPHERE 160-9-4.8 mcg/actuation HFAA, Inhale 1 puff into the lungs 2 (two) times a day., Disp: , Rfl:     cholecalciferol, vitamin D3, (VITAMIN D3) 25 mcg (1,000 unit) capsule, Take 1,000 Units by mouth once daily., Disp: , Rfl:     cyanocobalamin (VITAMIN B-12) 1000 MCG tablet, Take 100 mcg by mouth once daily., Disp: , Rfl:     finasteride (PROSCAR) 5 mg tablet, Take 1 tablet by mouth once daily., Disp: , Rfl:     ketorolac (TORADOL) 10 mg tablet, Take 10 mg by mouth every 6 (six) hours., Disp: , Rfl:     mecobalamin, vitamin B12, 1,000 mcg Chew, Take 1 tablet by mouth once daily., Disp: , Rfl:     pantoprazole (PROTONIX) 40 MG tablet, Take 40 mg by mouth once daily., Disp: , Rfl:     predniSONE (DELTASONE) 20 MG tablet, Take 20 mg by mouth once daily. For flare-up, Disp: , Rfl:     rosuvastatin (CRESTOR) 10 MG tablet, Take 1 tablet by mouth once daily., Disp: , Rfl:     tamsulosin (FLOMAX) 0.4 mg Cp24, Take 1 capsule by mouth once daily., Disp: , Rfl:     venlafaxine (EFFEXOR-XR) 150 MG Cp24, Take 1 capsule by mouth once daily., Disp: , Rfl:     vitamin K2 100 mcg Cap, Take 1 capsule by mouth once daily., Disp: , Rfl:     AEROCHAMBER PLUS FLOW-VU, , Disp: , Rfl:     fluticasone-umeclidin-vilanter (TRELEGY ELLIPTA) 100-62.5-25 mcg DsDv, Inhale 1 puff into the lungs  once daily., Disp: , Rfl:     nitroGLYCERIN (NITROSTAT) 0.4 MG SL tablet, Place 0.4 mg under the tongue every 5 (five) minutes as needed., Disp: , Rfl:       Scheduled Meds:  Continuous Infusions:  PRN Meds:.      Tressa Menjivar  [facility] Hospitalist NP  07/24/2022

## 2022-07-25 VITALS
TEMPERATURE: 98 F | RESPIRATION RATE: 96 BRPM | HEART RATE: 100 BPM | BODY MASS INDEX: 22.55 KG/M2 | OXYGEN SATURATION: 93 % | HEIGHT: 74 IN | DIASTOLIC BLOOD PRESSURE: 107 MMHG | SYSTOLIC BLOOD PRESSURE: 152 MMHG | WEIGHT: 175.69 LBS

## 2022-07-25 LAB
ANION GAP SERPL CALC-SCNC: 3 MMOL/L (ref 8–16)
BASOPHILS # BLD AUTO: 0 K/UL (ref 0–0.2)
BASOPHILS NFR BLD: 0 % (ref 0–1.9)
BUN SERPL-MCNC: 14 MG/DL (ref 8–23)
CALCIUM SERPL-MCNC: 8.9 MG/DL (ref 8.7–10.5)
CHLORIDE SERPL-SCNC: 105 MMOL/L (ref 95–110)
CO2 SERPL-SCNC: 27 MMOL/L (ref 23–29)
CREAT SERPL-MCNC: 0.9 MG/DL (ref 0.5–1.4)
DIFFERENTIAL METHOD: ABNORMAL
EOSINOPHIL # BLD AUTO: 0 K/UL (ref 0–0.5)
EOSINOPHIL NFR BLD: 0 % (ref 0–8)
ERYTHROCYTE [DISTWIDTH] IN BLOOD BY AUTOMATED COUNT: 13.4 % (ref 11.5–14.5)
EST. GFR  (AFRICAN AMERICAN): >60 ML/MIN/1.73 M^2
EST. GFR  (NON AFRICAN AMERICAN): >60 ML/MIN/1.73 M^2
GLUCOSE SERPL-MCNC: 133 MG/DL (ref 70–110)
HCT VFR BLD AUTO: 39.7 % (ref 40–54)
HGB BLD-MCNC: 13.3 G/DL (ref 14–18)
IMM GRANULOCYTES # BLD AUTO: 0.02 K/UL (ref 0–0.04)
IMM GRANULOCYTES NFR BLD AUTO: 0.4 % (ref 0–0.5)
LYMPHOCYTES # BLD AUTO: 0.6 K/UL (ref 1–4.8)
LYMPHOCYTES NFR BLD: 10.8 % (ref 18–48)
MCH RBC QN AUTO: 30.3 PG (ref 27–31)
MCHC RBC AUTO-ENTMCNC: 33.5 G/DL (ref 32–36)
MCV RBC AUTO: 90 FL (ref 82–98)
MONOCYTES # BLD AUTO: 0.1 K/UL (ref 0.3–1)
MONOCYTES NFR BLD: 2 % (ref 4–15)
NEUTROPHILS # BLD AUTO: 4.8 K/UL (ref 1.8–7.7)
NEUTROPHILS NFR BLD: 86.8 % (ref 38–73)
NRBC BLD-RTO: 0 /100 WBC
PLATELET # BLD AUTO: 273 K/UL (ref 150–450)
PMV BLD AUTO: 9.3 FL (ref 9.2–12.9)
POTASSIUM SERPL-SCNC: 4.2 MMOL/L (ref 3.5–5.1)
RBC # BLD AUTO: 4.39 M/UL (ref 4.6–6.2)
SODIUM SERPL-SCNC: 135 MMOL/L (ref 136–145)
WBC # BLD AUTO: 5.53 K/UL (ref 3.9–12.7)

## 2022-07-25 PROCEDURE — 36415 COLL VENOUS BLD VENIPUNCTURE: CPT | Performed by: NURSE PRACTITIONER

## 2022-07-25 PROCEDURE — 27000221 HC OXYGEN, UP TO 24 HOURS

## 2022-07-25 PROCEDURE — 94761 N-INVAS EAR/PLS OXIMETRY MLT: CPT

## 2022-07-25 PROCEDURE — 25000242 PHARM REV CODE 250 ALT 637 W/ HCPCS: Performed by: NURSE PRACTITIONER

## 2022-07-25 PROCEDURE — 25000003 PHARM REV CODE 250: Performed by: NURSE PRACTITIONER

## 2022-07-25 PROCEDURE — 99900031 HC PATIENT EDUCATION (STAT)

## 2022-07-25 PROCEDURE — 99900035 HC TECH TIME PER 15 MIN (STAT)

## 2022-07-25 PROCEDURE — 96376 TX/PRO/DX INJ SAME DRUG ADON: CPT

## 2022-07-25 PROCEDURE — 63600175 PHARM REV CODE 636 W HCPCS: Performed by: HOSPITALIST

## 2022-07-25 PROCEDURE — 80048 BASIC METABOLIC PNL TOTAL CA: CPT | Performed by: NURSE PRACTITIONER

## 2022-07-25 PROCEDURE — 94640 AIRWAY INHALATION TREATMENT: CPT

## 2022-07-25 PROCEDURE — 85025 COMPLETE CBC W/AUTO DIFF WBC: CPT | Performed by: NURSE PRACTITIONER

## 2022-07-25 PROCEDURE — S4991 NICOTINE PATCH NONLEGEND: HCPCS | Performed by: NURSE PRACTITIONER

## 2022-07-25 PROCEDURE — 25000003 PHARM REV CODE 250: Performed by: INTERNAL MEDICINE

## 2022-07-25 PROCEDURE — G0378 HOSPITAL OBSERVATION PER HR: HCPCS | Mod: CS

## 2022-07-25 RX ORDER — IBUPROFEN 200 MG
1 TABLET ORAL DAILY
Qty: 14 PATCH | Refills: 0 | Status: SHIPPED | OUTPATIENT
Start: 2022-07-26 | End: 2022-08-09

## 2022-07-25 RX ORDER — PREDNISONE 20 MG/1
40 TABLET ORAL DAILY
Qty: 3 TABLET | Refills: 0 | Status: SHIPPED | OUTPATIENT
Start: 2022-07-25 | End: 2022-07-28

## 2022-07-25 RX ORDER — GUAIFENESIN 600 MG/1
600 TABLET, EXTENDED RELEASE ORAL 2 TIMES DAILY PRN
Qty: 30 TABLET | Refills: 0 | Status: SHIPPED | OUTPATIENT
Start: 2022-07-25 | End: 2022-10-13

## 2022-07-25 RX ORDER — BENZONATATE 200 MG/1
200 CAPSULE ORAL EVERY 12 HOURS PRN
Qty: 30 CAPSULE | Refills: 0 | Status: SHIPPED | OUTPATIENT
Start: 2022-07-25 | End: 2022-10-13

## 2022-07-25 RX ORDER — ACETAMINOPHEN 325 MG/1
650 TABLET ORAL EVERY 6 HOURS PRN
Status: DISCONTINUED | OUTPATIENT
Start: 2022-07-25 | End: 2022-07-25 | Stop reason: HOSPADM

## 2022-07-25 RX ORDER — PREDNISONE 20 MG/1
40 TABLET ORAL DAILY
Status: DISCONTINUED | OUTPATIENT
Start: 2022-07-25 | End: 2022-07-25 | Stop reason: HOSPADM

## 2022-07-25 RX ADMIN — VENLAFAXINE HYDROCHLORIDE 150 MG: 150 CAPSULE, EXTENDED RELEASE ORAL at 09:07

## 2022-07-25 RX ADMIN — PREDNISONE 40 MG: 20 TABLET ORAL at 11:07

## 2022-07-25 RX ADMIN — NICOTINE 1 PATCH: 21 PATCH, EXTENDED RELEASE TRANSDERMAL at 09:07

## 2022-07-25 RX ADMIN — IPRATROPIUM BROMIDE AND ALBUTEROL SULFATE 3 ML: .5; 3 SOLUTION RESPIRATORY (INHALATION) at 02:07

## 2022-07-25 RX ADMIN — METHYLPREDNISOLONE SODIUM SUCCINATE 80 MG: 125 INJECTION, POWDER, FOR SOLUTION INTRAMUSCULAR; INTRAVENOUS at 05:07

## 2022-07-25 RX ADMIN — FAMOTIDINE 20 MG: 20 TABLET ORAL at 09:07

## 2022-07-25 RX ADMIN — ATORVASTATIN CALCIUM 20 MG: 20 TABLET, FILM COATED ORAL at 09:07

## 2022-07-25 RX ADMIN — HYDROCODONE POLISTIREX AND CHLORPHENIRAMINE POLISTIREX 5 ML: 10; 8 SUSPENSION, EXTENDED RELEASE ORAL at 12:07

## 2022-07-25 RX ADMIN — ACETAMINOPHEN 650 MG: 325 TABLET ORAL at 03:07

## 2022-07-25 RX ADMIN — IPRATROPIUM BROMIDE AND ALBUTEROL SULFATE 3 ML: .5; 3 SOLUTION RESPIRATORY (INHALATION) at 07:07

## 2022-07-25 NOTE — PLAN OF CARE
[facility]  Initial Discharge Assessment       Primary Care Provider: Dara Kemp MD    Admission Diagnosis: COPD with acute exacerbation [J44.1]    Admission Date: 7/24/2022  Expected Discharge Date:      met with pt.at bedside to complete assessment. Demographics, PCP, and insurance verified.  Patient is alert, oriented and able to answer all questions. Pharmacy of choice is [facility] on  [address]  Patient is self care. Wife to drive home on discharge. Explained KELLEY as Pt. Has Medicare,  and verbalized understanding.     Discharge Barriers Identified: (P) None    Payor: MEDICARE / Plan: MEDICARE PART A & B / Product Type: Government /     Extended Emergency Contact Information  Primary Emergency Contact: Luann Vela  Address: [address] [place] [address]           LUIS GARDNER [address] [facility]  Home Phone: [phone]  Mobile Phone: [phone]  Relation: Spouse    Discharge Plan A: (P) Home with family  Discharge Plan B: (P) Home with family      [id number] - [place] - [address]  [address]  [place] [address]  Phone: [phone] Fax: [phone]      Initial Assessment (most recent)       Adult Discharge Assessment - 07/25/22 0847          Discharge Assessment    Assessment Type Discharge Planning Assessment (P)      Confirmed/corrected address, phone number and insurance Yes (P)      Confirmed Demographics Correct on Facesheet (P)      Source of Information patient (P)      Does patient/caregiver understand observation status Yes (P)      Communicated AVANI with patient/caregiver Date not available/Unable to determine (P)      Reason For Admission COPD (P)      Lives With spouse (P)      Do you expect to return to your current living situation? Yes (P)      Do you have help at home or someone to help you manage your care at home? Yes (P)      Who are your caregiver(s)  and their phone number(s)? Luann Vela Spouse   [phone] Mobile (P)      Prior to hospitilization cognitive status: Alert/Oriented (P)      Current cognitive status: Alert/Oriented (P)      Walking or Climbing Stairs Difficulty none (P)      Dressing/Bathing Difficulty none (P)      Home Accessibility wheelchair accessible (P)      Home Layout Able to live on 1st floor (P)      Equipment Currently Used at Home CPAP;nebulizer;oxygen;walker, standard;bedside commode (P)      Readmission within 30 days? No (P)      Patient currently being followed by outpatient case management? No (P)      Do you currently have service(s) that help you manage your care at home? No (P)      Do you take prescription medications? Yes (P)      Do you have prescription coverage? Yes (P)      Do you have any problems affording any of your prescribed medications? No (P)      Is the patient taking medications as prescribed? yes (P)      Who is going to help you get home at discharge? Luann Vela Spouse   [phone] Mobile (P)      How do you get to doctors appointments? car, drives self (P)      Are you on dialysis? No (P)      Do you take coumadin? No (P)      Discharge Plan A Home with family (P)      Discharge Plan B Home with family (P)      DME Needed Upon Discharge  none (P)      Discharge Plan discussed with: Patient (P)      Discharge Barriers Identified None (P)         Relationship/Environment    Name(s) of Who Lives With Patient Luann Vela Spouse   [phone] Mobile (P)                                  07/25/22 0847   Discharge Assessment   Assessment Type Discharge Planning Assessment   Confirmed/corrected address, phone number and insurance Yes   Confirmed Demographics Correct on Facesheet   Source of Information patient   Does patient/caregiver understand observation status Yes   Communicated AVANI with patient/caregiver Date not available/Unable to determine   Reason For Admission COPD   Lives With spouse   Do you expect  to return to your current living situation? Yes   Do you have help at home or someone to help you manage your care at home? Yes   Who are your caregiver(s) and their phone number(s)? Luann Vela Spouse   [phone] Mobile   Prior to hospitilization cognitive status: Alert/Oriented   Current cognitive status: Alert/Oriented   Walking or Climbing Stairs Difficulty none   Dressing/Bathing Difficulty none   Home Accessibility wheelchair accessible   Home Layout Able to live on 1st floor   Equipment Currently Used at Home CPAP;nebulizer;oxygen;walker, standard;bedside commode   Readmission within 30 days? No   Patient currently being followed by outpatient case management? No   Do you currently have service(s) that help you manage your care at home? No   Do you take prescription medications? Yes   Do you have prescription coverage? Yes   Do you have any problems affording any of your prescribed medications? No   Is the patient taking medications as prescribed? yes   Who is going to help you get home at discharge? Luann Vela Spouse   [phone] Mobile   How do you get to doctors appointments? car, drives self   Are you on dialysis? No   Do you take coumadin? No   Discharge Plan A Home with family   Discharge Plan B Home with family   DME Needed Upon Discharge  none   Discharge Plan discussed with: Patient   Discharge Barriers Identified None   Relationship/Environment   Name(s) of Who Lives With Patient Luann Vela Spouse   [phone] Mobile

## 2022-07-25 NOTE — CARE UPDATE
07/24/22 1922   Patient Assessment/Suction   Level of Consciousness (AVPU) alert   Respiratory Effort Normal;Unlabored   Expansion/Accessory Muscles/Retractions expansion symmetric   ANABELA Breath Sounds clear   RUL Breath Sounds diminished   Cough Frequency frequent   Cough Type nonproductive;dry   PRE-TX-O2   O2 Device (Oxygen Therapy) nasal cannula   Flow (L/min) 2   SpO2 (!) 94 %   Pulse Oximetry Type Continuous   $ Pulse Oximetry - Multiple Charge Pulse Oximetry - Multiple   Pulse 87   Resp 17   Aerosol Therapy   $ Aerosol Therapy Charges Aerosol Treatment   Daily Review of Necessity (SVN) completed   Respiratory Treatment Status (SVN) given   Treatment Route (SVN) mask;oxygen   Patient Position (SVN) HOB elevated   Post Treatment Assessment (SVN) increased aeration   Signs of Intolerance (SVN) none   Breath Sounds Post-Respiratory Treatment   Post-treatment Heart Rate (beats/min) 89   Post-treatment Resp Rate (breaths/min) 15   Education   $ Education BiPAP;Bronchodilator;15 min   Respiratory Evaluation   $ Care Plan Tech Time 15 min

## 2022-07-25 NOTE — DISCHARGE SUMMARY
Critical access hospital Medicine  Discharge Summary      Patient Name: Tal Vela  MRN: [id number]  Patient Class: OP- Observation  Admission Date: 7/24/2022  Hospital Length of Stay: 0 days  Discharge Date and Time:  07/25/2022 11:43 AM  Attending Physician: Chan Briones DO   Discharging Provider: Chan Briones DO  Primary Care Provider: Dara Kemp MD      HPI:   No notes on file    * No surgery found *      Hospital Course:   Patient is 71 old male who was admitted with acute COPD exacerbation.  Patient states he now feels baseline.  He was discharged home in stable condition.    Patient with a history of underlying chronic respiratory failure on 2 L home O2.  He has been instructed to follow-up with his pulmonologist in 1 week.  Follow-up with PCP in 3 days.       Goals of Care Treatment Preferences:  Code Status: Full Code      Consults:   Consults (From admission, onward)        Status Ordering Provider     Inpatient consult to Hospitalist  Once        Provider:  MONSE Walton LLOYD J. JR          No new Assessment & Plan notes have been filed under this hospital service since the last note was generated.  Service: Hospital Medicine    Final Active Diagnoses:    Diagnosis Date Noted POA    PRINCIPAL PROBLEM:  COPD exacerbation [J44.1] 07/24/2022 Yes      Problems Resolved During this Admission:       Discharged Condition:  Patient appears no distress sitting in bed  Speaking full sentences  Lungs symmetrical expansion occasional rhonchi good air movement  Heart regular  Rate rhythm  Neuro patient is alert oriented x3    Disposition: Home or Self Care    Follow Up:   Follow-up Information     Dara Kemp MD Follow up in 3 day(s).    Specialties: Hospitalist, Internal Medicine  Contact information:  [address]  [address]  [facility] [address]  [phone]             Timothy Zimmerman MD Follow up in 1 week(s).    Specialty:  Pulmonary Disease  Contact information:  [address] [facility] [address]  [phone]                       Patient Instructions:      Diet Adult Regular     Activity as tolerated       Significant Diagnostic Studies: Labs:   BMP:   Recent Labs   Lab 07/24/22  1259 07/25/22  0537   GLU 93 133*    135*   K 3.5 4.2    105   CO2 29 27   BUN 10 14   CREATININE 0.9 0.9   CALCIUM 9.2 8.9   , CMP   Recent Labs   Lab 07/24/22  1259 07/25/22  0537    135*   K 3.5 4.2    105   CO2 29 27   GLU 93 133*   BUN 10 14   CREATININE 0.9 0.9   CALCIUM 9.2 8.9   PROT 6.9  --    ALBUMIN 3.7  --    BILITOT 0.6  --    ALKPHOS 15*  --    AST 22  --    ALT 23  --    ANIONGAP 7* 3*   ESTGFRAFRICA >60.0 >60.0   EGFRNONAA >60.0 >60.0    and CBC   Recent Labs   Lab 07/24/22  1259 07/25/22  0537   WBC 7.04 5.53   HGB 13.3* 13.3*   HCT 39.6* 39.7*    273       Pending Diagnostic Studies:     None         Medications:  Reconciled Home Medications:      Medication List      START taking these medications    guaiFENesin 600 mg 12 hr tablet  Commonly known as: MUCINEX  Take 1 tablet (600 mg total) by mouth 2 (two) times daily as needed (cough).     nicotine 21 mg/24 hr  Commonly known as: NICODERM CQ  Place 1 patch onto the skin once daily. for 14 days  Start taking on: July 26, 2022        CHANGE how you take these medications    predniSONE 20 MG tablet  Commonly known as: DELTASONE  Take 2 tablets (40 mg total) by mouth once daily. for 3 days  What changed:   · how much to take  · additional instructions        CONTINUE taking these medications    amLODIPine 2.5 MG tablet  Commonly known as: NORVASC  Take 2.5 mg by mouth every evening.     benzonatate 200 MG capsule  Commonly known as: TESSALON  Take 1 capsule (200 mg total) by mouth every 12 (twelve) hours as needed.     BREZTRI AEROSPHERE 160-9-4.8 mcg/actuation Hfaa  Generic drug: budesonide-glycopyr-formoterol  Inhale 1 puff into the lungs 2 (two) times a  day.     cholecalciferol (vitamin D3) 25 mcg (1,000 unit) capsule  Commonly known as: VITAMIN D3  Take 1,000 Units by mouth once daily.     cyanocobalamin 1000 MCG tablet  Commonly known as: VITAMIN B-12  Take 100 mcg by mouth once daily.     finasteride 5 mg tablet  Commonly known as: PROSCAR  Take 1 tablet by mouth nightly.     mecobalamin (vitamin B12) 1,000 mcg Chew  Take 1 tablet by mouth once daily.     nitroGLYCERIN 0.4 MG SL tablet  Commonly known as: NITROSTAT  Place 0.4 mg under the tongue every 5 (five) minutes as needed.     pantoprazole 40 MG tablet  Commonly known as: PROTONIX  Take 40 mg by mouth once daily.     rosuvastatin 10 MG tablet  Commonly known as: CRESTOR  Take 1 tablet by mouth once daily.     tamsulosin 0.4 mg Cap  Commonly known as: FLOMAX  Take 1 capsule by mouth every evening.     venlafaxine 150 MG Cp24  Commonly known as: EFFEXOR-XR  Take 1 capsule by mouth once daily.     vitamin K2 100 mcg Cap  Take 1 capsule by mouth once daily.        STOP taking these medications    AEROCHAMBER PLUS FLOW-VU  Generic drug: inhalation spacing device     fluticasone-umeclidin-vilanter 100-62.5-25 mcg Dsdv  Commonly known as: TRELEGY ELLIPTA     ketorolac 10 mg tablet  Commonly known as: TORADOL            Indwelling Lines/Drains at time of discharge:   Lines/Drains/Airways     None                 Time spent on the discharge of patient: 22 minutes         Chan Briones DO  Department of Hospital Medicine  [facility]

## 2022-07-25 NOTE — PLAN OF CARE
07/25/22 0854   KELLEY Message   Medicare Outpatient and Observation Notification regarding financial responsibility Explained to patient/caregiver;Signed/date by patient/caregiver   Date KELLEY was signed 07/25/22   Time KELLEY was signed 0874

## 2022-07-25 NOTE — HOSPITAL COURSE
Patient is 71 old male who was admitted with acute COPD exacerbation.  Patient states he now feels baseline.  He was discharged home in stable condition.    Patient with a history of underlying chronic respiratory failure on 2 L home O2.  He has been instructed to follow-up with his pulmonologist in 1 week.  Follow-up with PCP in 3 days.

## 2022-07-25 NOTE — PLAN OF CARE
Problem: Airway Clearance Ineffective (Pulmonary Impairment)  Goal: Effective Airway Clearance  Outcome: Ongoing, Progressing     Problem: Activity Intolerance (Pulmonary Impairment)  Goal: Improved Activity Tolerance  Outcome: Ongoing, Progressing

## 2022-07-25 NOTE — CARE UPDATE
07/25/22 0708   Patient Assessment/Suction   Level of Consciousness (AVPU) alert   Respiratory Effort Normal;Unlabored   Expansion/Accessory Muscles/Retractions no use of accessory muscles;expansion symmetric;no retractions   All Lung Fields Breath Sounds clear;diminished   Rhythm/Pattern, Respiratory unlabored;pattern regular;depth regular;chest wiggle adequate;no shortness of breath reported   Cough Frequency no cough   PRE-TX-O2   O2 Device (Oxygen Therapy) nasal cannula w/ humidification   $ Is the patient on Low Flow Oxygen? Yes   Flow (L/min) 2   SpO2 99 %   Pulse Oximetry Type Intermittent   $ Pulse Oximetry - Multiple Charge Pulse Oximetry - Multiple   Pulse 95   Resp 17   Aerosol Therapy   $ Aerosol Therapy Charges Aerosol Treatment   Daily Review of Necessity (SVN) completed   Respiratory Treatment Status (SVN) given   Treatment Route (SVN) oxygen;mask   Patient Position (SVN) HOB elevated   Post Treatment Assessment (SVN) increased aeration   Signs of Intolerance (SVN) none   Breath Sounds Post-Respiratory Treatment   Throughout All Fields Post-Treatment All Fields   Throughout All Fields Post-Treatment aeration increased   Post-treatment Heart Rate (beats/min) 95   Post-treatment Resp Rate (breaths/min) 18   Education   $ Education Bronchodilator;15 min   Respiratory Evaluation   $ Care Plan Tech Time 15 min

## 2022-07-25 NOTE — PLAN OF CARE
Met with Patient/Caregiver to provide MOON. MOON signed by Patient/Caregiver, copy of KELLEY provided to Patient/Caregiver, and KELLEY will be scanned to chart.

## 2022-07-25 NOTE — PLAN OF CARE
Patient is cleared for discharge from case management. Patient is discharging home.       07/25/22 1226   Final Note   Assessment Type Final Discharge Note   Anticipated Discharge Disposition Home   What phone number can be called within the next 1-3 days to see how you are doing after discharge? [phone]

## 2023-08-29 ENCOUNTER — OFFICE VISIT (OUTPATIENT)
Dept: CARDIOLOGY | Facility: CLINIC | Age: 73
End: 2023-08-29
Payer: MEDICARE

## 2023-08-29 ENCOUNTER — TELEPHONE (OUTPATIENT)
Dept: CARDIOLOGY | Facility: CLINIC | Age: 73
End: 2023-08-29
Payer: MEDICARE

## 2023-08-29 VITALS
HEART RATE: 110 BPM | BODY MASS INDEX: 22.07 KG/M2 | DIASTOLIC BLOOD PRESSURE: 78 MMHG | WEIGHT: 171.94 LBS | HEIGHT: 74 IN | SYSTOLIC BLOOD PRESSURE: 122 MMHG

## 2023-08-29 DIAGNOSIS — R00.2 PALPITATIONS: ICD-10-CM

## 2023-08-29 DIAGNOSIS — R06.02 SOB (SHORTNESS OF BREATH): ICD-10-CM

## 2023-08-29 DIAGNOSIS — I70.0 AORTIC ATHEROSCLEROSIS: ICD-10-CM

## 2023-08-29 DIAGNOSIS — J43.9 PULMONARY EMPHYSEMA, UNSPECIFIED EMPHYSEMA TYPE: Primary | ICD-10-CM

## 2023-08-29 DIAGNOSIS — R06.02 SOB (SHORTNESS OF BREATH): Primary | ICD-10-CM

## 2023-08-29 PROCEDURE — 93005 ELECTROCARDIOGRAM TRACING: CPT | Mod: PBBFAC,PN | Performed by: INTERNAL MEDICINE

## 2023-08-29 PROCEDURE — 99999 PR PBB SHADOW E&M-EST. PATIENT-LVL III: ICD-10-PCS | Mod: PBBFAC,,, | Performed by: SPECIALIST

## 2023-08-29 PROCEDURE — 99204 OFFICE O/P NEW MOD 45 MIN: CPT | Mod: S$PBB,,, | Performed by: SPECIALIST

## 2023-08-29 PROCEDURE — 93010 EKG 12-LEAD: ICD-10-PCS | Mod: S$PBB,76,, | Performed by: INTERNAL MEDICINE

## 2023-08-29 PROCEDURE — 99999 PR PBB SHADOW E&M-EST. PATIENT-LVL III: CPT | Mod: PBBFAC,,, | Performed by: SPECIALIST

## 2023-08-29 PROCEDURE — 99213 OFFICE O/P EST LOW 20 MIN: CPT | Mod: PBBFAC,PN | Performed by: SPECIALIST

## 2023-08-29 PROCEDURE — 99204 PR OFFICE/OUTPT VISIT, NEW, LEVL IV, 45-59 MIN: ICD-10-PCS | Mod: S$PBB,,, | Performed by: SPECIALIST

## 2023-08-29 PROCEDURE — 93010 ELECTROCARDIOGRAM REPORT: CPT | Mod: S$PBB,76,, | Performed by: INTERNAL MEDICINE

## 2023-08-29 NOTE — TELEPHONE ENCOUNTER
----- Message from Princess Colunga RN sent at 2023  4:52 PM CDT -----  Regardin/29 EKG Order  The EKG performed on 23 is missing an order.  Please place an order so that the EKG can be read in [place].    Thank you,  Princess Colunga RN  [facility]   [facility]  3rd [facility]  [phone]/ [id number]

## 2023-08-29 NOTE — PROGRESS NOTES
Subjective:    Patient ID:  Tal Vela is a 72 y.o. male who presents for   Establish Care and Shortness of Breath    HPI   52 pack years  quit 6 mos ago    -  sob  gets worse            Has been sobb  sees lung md       No  cardiac hx         No  swell ing in feet -  ctlung  minor ca in [place]  Comes in to find out if a fib -patient is concerned he has atrial fibrillation but today's in sinus rhythm with normal EKGs  Previous BNP was negative  He denies any chest pain or prior myocardial infarction revascularization history  Review of patient's allergies indicates:   Allergen Reactions    Iodine and iodide containing products Anaphylaxis    Shellfish containing products Anaphylaxis    Doxycycline Other (See Comments)     headache       Past Medical History:   Diagnosis Date    BPH (benign prostatic hyperplasia)     Chondrocalcinosis of knee     Degenerative arthritis of left knee     Degenerative arthritis of left wrist     Oxygen dependent     2L    Soft tissue tumor     Tenosynovitis, wrist      Past Surgical History:   Procedure Laterality Date    ELBOW SURGERY      pinning (in high school)    TOTAL KNEE ARTHROPLASTY Left 11/10/2014    TOTAL KNEE ARTHROPLASTY Right 2006     Social History     Tobacco Use    Smoking status: Some Days     Current packs/day: 1.00     Types: Cigarettes    Smokeless tobacco: Never   Substance Use Topics    Alcohol use: No    Drug use: No        Review of Systems     Review of Systems   Constitutional: Negative. Negative for decreased appetite, malaise/fatigue, weight gain and weight loss.   HENT: Negative.  Negative for congestion, hearing loss and tinnitus.    Eyes:  Negative for blurred vision, vision loss in left eye, vision loss in right eye, visual disturbance and visual halos.   Cardiovascular:  Positive for chest pain. Negative for claudication, dyspnea on exertion, irregular heartbeat, leg swelling, near-syncope, orthopnea, palpitations, paroxysmal nocturnal dyspnea and  syncope.        Atypical cp    Respiratory:  Positive for hemoptysis and shortness of breath. Negative for cough, sleep disturbances due to breathing, snoring, sputum production and wheezing.         Wears o2 all time      Endocrine: Negative for polydipsia, polyphagia and polyuria.   Hematologic/Lymphatic: Negative for adenopathy. Does not bruise/bleed easily.   Skin:  Positive for color change and skin cancer. Negative for dry skin, itching, poor wound healing, rash and suspicious lesions.   Musculoskeletal:  Negative for arthritis, back pain, falls, gout, joint pain, joint swelling, muscle cramps, muscle weakness, neck pain and stiffness.   Gastrointestinal: Negative.  Negative for abdominal pain, change in bowel habit, constipation, diarrhea, heartburn, hematemesis, hemorrhoids, melena, nausea and vomiting.   Genitourinary: Negative.  Negative for bladder incontinence, dysuria, flank pain, frequency, hematuria, nocturia and non-menstrual bleeding.        Bph    Neurological: Negative.  Negative for brief paralysis, difficulty with concentration, disturbances in coordination, dizziness, focal weakness, headaches, loss of balance, numbness, paresthesias and tremors.   Psychiatric/Behavioral:  Negative for altered mental status, depression, hallucinations, memory loss, substance abuse, suicidal ideas and thoughts of violence. The patient does not have insomnia and is not nervous/anxious.    Allergic/Immunologic: Negative for environmental allergies and hives.           Objective:        Vitals:    08/29/23 1400   BP: 122/78   Pulse: 110       Lab Results   Component Value Date    WBC 5.53 07/25/2022    HGB 13.3 (L) 07/25/2022    HCT 39.7 (L) 07/25/2022     07/25/2022    CHOL 212 (H) 07/05/2022    TRIG 56 07/05/2022    HDL 91 07/05/2022    ALT 23 07/24/2022    AST 22 07/24/2022     (L) 07/25/2022    K 4.2 07/25/2022     07/25/2022    CREATININE 0.9 07/25/2022    BUN 14 07/25/2022    CO2 27  07/25/2022    TSH 0.530 07/05/2022    INR 1.0 07/24/2022    HGBA1C 5.9 (H) 07/05/2022        ECHOCARDIOGRAM RESULTS  No results found for this or any previous visit.      CURRENT/PREVIOUS VISIT EKG  Results for orders placed or performed during the hospital encounter of 07/24/22   EKG 12-lead    Collection Time: 07/24/22  1:12 PM    Narrative    Test Reason : R06.02,    Vent. Rate : 083 BPM     Atrial Rate : 083 BPM     P-R Int : 124 ms          QRS Dur : 104 ms      QT Int : 380 ms       P-R-T Axes : 081 079 074 degrees     QTc Int : 446 ms    Normal sinus rhythm  Incomplete right bundle branch block  Borderline Abnormal ECG  When compared with ECG of 03-JAN-2020 20:45,  No significant change was found  Confirmed by Cole Oliveros MD ([id number]) on 7/27/2022 10:01:53 PM    Referred By: [email or web address]   SELF           Confirmed By:Cole Oliveros MD     No results found for this or any previous visit.    No results found for this or any previous visit.      PHYSICAL EXAM    Physical Exam 72-year-old male in mild respiratory distress with oxygen on  Head neck no neck vein  Lungs marked wheeze-he can only hold his breath for 1 or 2 seconds before he gets short of breath  Abdomen no masses  Extremities thin without flatus edema  Slight decreased pulses decreased elasticity    Medication List with Changes/Refills   Current Medications    AMLODIPINE (NORVASC) 2.5 MG TABLET    Take 2.5 mg by mouth every evening.    BREZTRI AEROSPHERE 160-9-4.8 MCG/ACTUATION HFAA    Inhale 1 puff into the lungs 2 (two) times a day.    CETIRIZINE (ZYRTEC) 10 MG CAP    Take 10 mg by mouth.    CHOLECALCIFEROL, VITAMIN D3, (VITAMIN D3) 25 MCG (1,000 UNIT) CAPSULE    Take 1,000 Units by mouth once daily.    CYANOCOBALAMIN (VITAMIN B-12) 1000 MCG TABLET    Take 100 mcg by mouth once daily.    DOXYCYCLINE (VIBRAMYCIN) 100 MG CAP    Take 100 mg by mouth every 12 (twelve) hours.    FINASTERIDE (PROSCAR) 5 MG TABLET    Take 1 tablet by mouth nightly.     MECOBALAMIN, VITAMIN B12, 1,000 MCG CHEW    Take 1 tablet by mouth once daily.    NITROGLYCERIN (NITROSTAT) 0.4 MG SL TABLET    Place 0.4 mg under the tongue every 5 (five) minutes as needed.    PANTOPRAZOLE (PROTONIX) 40 MG TABLET    Take 40 mg by mouth once daily.    ROSUVASTATIN (CRESTOR) 10 MG TABLET    Take 1 tablet by mouth once daily.    TAMSULOSIN (FLOMAX) 0.4 MG CP24    Take 1 capsule by mouth every evening.    VENLAFAXINE (EFFEXOR-XR) 150 MG CP24    Take 1 capsule by mouth once daily.    VITAMIN K2 100 MCG CAP    Take 1 capsule by mouth once daily.           Assessment:     Severe emphysema  Question of ASCVD  No atrial fibrillation has been picked up    I substantially and  personally reviewed old and new ecg's, lab reports,, xray reports  and  other cardiovascular studies including  echo's, stress tests, angiogram reports, holters,and vascular studies .  In addition I evaluated original cardiac cath  ___echo  ____cxr _ chest x-ray show severe emphysematous changes not a lot of calcium in his coronary _____ct ____scan on [employer] view or [employer] or other viewing platforms and  ____EKG's .   I reviewed  office and hospital notes Yes ____ of  referring providers and other providers.  I reviewed personally old hospital and office notes   Time spent evaluating and managing this patient:________min.         Plan:   Will get echo to check diastolic function and get event monitor for 14 days to check for intermittent atrial fib    Problem List Items Addressed This Visit    None       No follow-ups on file.

## 2023-10-09 ENCOUNTER — HOSPITAL ENCOUNTER (OUTPATIENT)
Dept: CARDIOLOGY | Facility: HOSPITAL | Age: 73
Discharge: HOME | End: 2023-10-09
Attending: SPECIALIST
Payer: MEDICARE

## 2023-10-09 VITALS — WEIGHT: 171.94 LBS | BODY MASS INDEX: 22.07 KG/M2 | HEIGHT: 74 IN

## 2023-10-09 DIAGNOSIS — J43.9 PULMONARY EMPHYSEMA, UNSPECIFIED EMPHYSEMA TYPE: ICD-10-CM

## 2023-10-09 LAB
AORTIC ROOT ANNULUS: 3.2 CM
AORTIC VALVE CUSP SEPERATION: 2.2 CM
AV INDEX (PROSTH): 0.94
AV MEAN GRADIENT: 2 MMHG
AV PEAK GRADIENT: 3 MMHG
AV REGURGITATION PRESSURE HALF TIME: 336 MS
AV VALVE AREA BY VELOCITY RATIO: 3.38 CM²
AV VALVE AREA: 3.59 CM²
AV VELOCITY RATIO: 0.89
BSA FOR ECHO PROCEDURE: 2.02 M2
CV ECHO LV RWT: 0.47 CM
DOP CALC AO PEAK VEL: 0.91 M/S
DOP CALC AO VTI: 14.3 CM
DOP CALC LVOT AREA: 3.8 CM2
DOP CALC LVOT DIAMETER: 2.2 CM
DOP CALC LVOT PEAK VEL: 0.81 M/S
DOP CALC LVOT STROKE VOLUME: 51.29 CM3
DOP CALC MV VTI: 9.3 CM
DOP CALCLVOT PEAK VEL VTI: 13.5 CM
E WAVE DECELERATION TIME: 155 MSEC
E/A RATIO: 0.6
E/E' RATIO: 5.67 M/S
ECHO LV POSTERIOR WALL: 1.08 CM (ref 0.6–1.1)
EJECTION FRACTION: 50 %
FRACTIONAL SHORTENING: 20 % (ref 28–44)
INTERVENTRICULAR SEPTUM: 1.08 CM (ref 0.6–1.1)
IVC DIAMETER: 1.85 CM
IVRT: 77 MSEC
LEFT ATRIUM SIZE: 2.8 CM
LEFT ATRIUM VOLUME INDEX MOD: 21.5 ML/M2
LEFT ATRIUM VOLUME MOD: 43.8 CM3
LEFT INTERNAL DIMENSION IN SYSTOLE: 3.65 CM (ref 2.1–4)
LEFT VENTRICLE DIASTOLIC VOLUME INDEX: 47.01 ML/M2
LEFT VENTRICLE DIASTOLIC VOLUME: 95.9 ML
LEFT VENTRICLE MASS INDEX: 86 G/M2
LEFT VENTRICLE SYSTOLIC VOLUME INDEX: 27.6 ML/M2
LEFT VENTRICLE SYSTOLIC VOLUME: 56.3 ML
LEFT VENTRICULAR INTERNAL DIMENSION IN DIASTOLE: 4.57 CM (ref 3.5–6)
LEFT VENTRICULAR MASS: 174.8 G
LV LATERAL E/E' RATIO: 5.67 M/S
LV SEPTAL E/E' RATIO: 5.67 M/S
LVOT MG: 1 MMHG
LVOT MV: 0.53 CM/S
MV MEAN GRADIENT: 1 MMHG
MV PEAK A VEL: 0.57 M/S
MV PEAK E VEL: 0.34 M/S
MV PEAK GRADIENT: 2 MMHG
MV VALVE AREA BY CONTINUITY EQUATION: 5.52 CM2
PISA AR MAX VEL: 2.64 M/S
PISA TR MAX VEL: 2.69 M/S
PV MV: 0.64 M/S
PV PEAK GRADIENT: 3 MMHG
PV PEAK VELOCITY: 0.91 M/S
RA PRESSURE ESTIMATED: 3 MMHG
RIGHT VENTRICULAR END-DIASTOLIC DIMENSION: 2.43 CM
RV TB RVSP: 6 MMHG
RV TISSUE DOPPLER FREE WALL SYSTOLIC VELOCITY 1 (APICAL 4 CHAMBER VIEW): 14.9 CM/S
TDI LATERAL: 0.06 M/S
TDI SEPTAL: 0.06 M/S
TDI: 0.06 M/S
TR MAX PG: 29 MMHG
TRICUSPID ANNULAR PLANE SYSTOLIC EXCURSION: 1.99 CM
TV REST PULMONARY ARTERY PRESSURE: 32 MMHG
Z-SCORE OF LEFT VENTRICULAR DIMENSION IN END DIASTOLE: -2.86
Z-SCORE OF LEFT VENTRICULAR DIMENSION IN END SYSTOLE: -0.16

## 2023-10-09 PROCEDURE — 93306 ECHO (CUPID ONLY): ICD-10-PCS | Mod: 26,,, | Performed by: INTERNAL MEDICINE

## 2023-10-09 PROCEDURE — 93306 TTE W/DOPPLER COMPLETE: CPT | Mod: 26,,, | Performed by: INTERNAL MEDICINE

## 2023-10-09 PROCEDURE — 93306 TTE W/DOPPLER COMPLETE: CPT

## 2023-10-18 ENCOUNTER — TELEPHONE (OUTPATIENT)
Dept: CARDIOLOGY | Facility: CLINIC | Age: 73
End: 2023-10-18
Payer: MEDICARE

## 2023-10-18 NOTE — TELEPHONE ENCOUNTER
----- Message from Angelica Sidhu sent at 10/18/2023 12:41 PM CDT -----  Type: Need Medical Advice   Who Called: Patient  Best callback number: [phone]  Additional Information: Patient called to schedule an appointment with mahsa since Dr Rodriguez has retired  Please call to further assist, Thanks.

## 2023-10-23 NOTE — PROGRESS NOTES
Subjective:    Patient ID:  Tal Vela is a 72 y.o. male who presents for follow-up of No chief complaint on file.      HPI:  KNOWN TO DR. PAYNE NEW TO ME  52 pack years  quit 6 mos ago    -  sob  gets worse             Has been sobb  sees lung md       No  cardiac hx         No  swell ing in feet -  ctlung  minor ca in [place]  Comes in to find out if a fib -patient is concerned he has atrial fibrillation but today's in sinus rhythm with normal EKGs  Previous BNP was negative  He denies any chest pain or prior myocardial infarction revascularization history          10/24/24  Here for followup holter eval of AFIB.;  Patient had a min HR of 62 bpm, max HR of 207 bpm, and avg HR of 98 bpm. Predominant underlying rhythm was Sinus Rhythm. 1 run of Ventricular Tachycardia occurred lasting 4 beats with a max rate of 156 bpm (avg 152 bpm). 601 Supraventricular Tachycardia runs occurred, the run with the fastest interval lasting 6 beats with a max rate of 207 bpm, the longest lasting 7 beats with an avg rate of 134 bpm. Isolated SVEs were occasional (1.4%, 18420), SVE Couplets were rare (<1.0%, 2455), and SVE Triplets were rare (<1.0%, 2190). Isolated VEs were rare (<1.0%), VE Couplets were rare (<1.0%), and no VE Triplets were present. Difficulty discerning atrial activity during periods of high heart rate, making definitive diagnosis between Sinus Tachycardia and Atrial Tachycardia difficult to ascertain.        Left Ventricle: The left ventricle is normal in size. Mildly increased wall thickness. There is concentric remodeling. Septal motion is consistent with bundle branch block. There is low normal systolic function. Ejection fraction by visual approximation is 50%. Grade I diastolic dysfunction.    Right Ventricle: Normal right ventricular cavity size. Wall thickness is normal. Right ventricle wall motion  is normal. Systolic function is normal.    Tricuspid Valve: There is mild regurgitation.    IVC/SVC: Normal  venous pressure at 3 mmHg.    The estimated pulmonary artery systolic pressure is 32 mmHg.       Gets occasional chest pain going to sleep and took extra amlodipine at night with relief        Review of patient's allergies indicates:   Allergen Reactions    Iodine and iodide containing products Anaphylaxis    Shellfish containing products Anaphylaxis    Iodine      Other reaction(s): Not available       Past Medical History:   Diagnosis Date    BPH (benign prostatic hyperplasia)     Chondrocalcinosis of knee     Degenerative arthritis of left knee     Degenerative arthritis of left wrist     Oxygen dependent     2L    Soft tissue tumor     Tenosynovitis, wrist      Past Surgical History:   Procedure Laterality Date    ELBOW SURGERY      pinning (in high school)    TOTAL KNEE ARTHROPLASTY Left 11/10/2014    TOTAL KNEE ARTHROPLASTY Right 2006     Social History     Tobacco Use    Smoking status: Some Days     Current packs/day: 1.00     Types: Cigarettes    Smokeless tobacco: Never   Substance Use Topics    Alcohol use: No    Drug use: No     Family History   Problem Relation Age of Onset    Lung disease Mother     Lung disease Father         Review of Systems:   Constitution: Negative for diaphoresis and fever.   HEENT: Negative for nosebleeds.    Cardiovascular: Negative for chest pain       No dyspnea on exertion       No leg swelling        No palpitations  Respiratory: Negative for shortness of breath and wheezing.    Hematologic/Lymphatic: Negative for bleeding problem. Does not bruise/bleed easily.   Skin: Negative for color change and rash.   Musculoskeletal: Negative for falls and myalgias.   Gastrointestinal: Negative for hematemesis and hematochezia.   Genitourinary: Negative for hematuria.   Neurological: Negative for dizziness and light-headedness.   Psychiatric/Behavioral: Negative for altered mental status and memory loss.          Objective:        There were no vitals filed for this visit.    Lab  Results   Component Value Date    WBC 5.53 07/25/2022    HGB 13.3 (L) 07/25/2022    HCT 39.7 (L) 07/25/2022     07/25/2022    CHOL 212 (H) 07/05/2022    TRIG 56 07/05/2022    HDL 91 07/05/2022    ALT 23 07/24/2022    AST 22 07/24/2022     (L) 07/25/2022    K 4.2 07/25/2022     07/25/2022    CREATININE 0.9 07/25/2022    BUN 14 07/25/2022    CO2 27 07/25/2022    TSH 0.530 07/05/2022    INR 1.0 07/24/2022    HGBA1C 5.9 (H) 07/05/2022        ECHOCARDIOGRAM RESULTS  Results for orders placed during the hospital encounter of 10/09/23    Echo    Interpretation Summary    Left Ventricle: The left ventricle is normal in size. Mildly increased wall thickness. There is concentric remodeling. Septal motion is consistent with bundle branch block. There is low normal systolic function. Ejection fraction by visual approximation is 50%. Grade I diastolic dysfunction.    Right Ventricle: Normal right ventricular cavity size. Wall thickness is normal. Right ventricle wall motion  is normal. Systolic function is normal.    Tricuspid Valve: There is mild regurgitation.    IVC/SVC: Normal venous pressure at 3 mmHg.    The estimated pulmonary artery systolic pressure is 32 mmHg.        CURRENT/PREVIOUS VISIT EKG  Results for orders placed or performed in visit on 08/29/23   IN OFFICE EKG 12-LEAD (to [place])    Collection Time: 08/29/23  2:11 PM    Narrative    Test Reason : R06.02,    Vent. Rate : 107 BPM     Atrial Rate : 107 BPM     P-R Int : 112 ms          QRS Dur : 088 ms      QT Int : 330 ms       P-R-T Axes : 085 082 076 degrees     QTc Int : 440 ms    Sinus tachycardia  Biatrial enlargement  RBBB, incomplete  Rightward axis  Anteroseptal infarct (cited on or before 29-AUG-2023)  Abnormal ECG  When compared with ECG of 29-AUG-2023 14:10,  No significant change was found  Confirmed by Cole Oliveros MD ([id number]) on 9/28/2023 9:57:15 AM    Referred By: [email or web address]   SELF           Confirmed By:Cole Oliveros MD     No  valid procedures specified.   No results found for this or any previous visit.      Physical Exam:  CONSTITUTIONAL: No fever, no chills  HEENT: Normocephalic, atraumatic,pupils reactive to light                 NECK:  No JVD no carotid bruit  CVS: S1S2+, RRR, no murmurs,   LUNGS: Clear  ABDOMEN: Soft, NT, BS+  EXTREMITIES: No cyanosis, edema  : No quintero catheter  NEURO: AAO X 3  PSY: Normal affect      Medication List with Changes/Refills   Current Medications    AEROCHAMBER PLUS FLOW-VU        AMLODIPINE (NORVASC) 2.5 MG TABLET    Take 2.5 mg by mouth every evening.    ASPIRIN (ECOTRIN) 325 MG EC TABLET    Take 1 tablet by mouth every morning.    BENZONATATE (TESSALON) 200 MG CAPSULE    Take by mouth.    BREZTRI AEROSPHERE 160-9-4.8 MCG/ACTUATION HFAA    Inhale 1 puff into the lungs 2 (two) times a day.    CETIRIZINE (ZYRTEC) 10 MG CAP    Take 10 mg by mouth.    CHOLECALCIFEROL, VITAMIN D3, (VITAMIN D3) 25 MCG (1,000 UNIT) CAPSULE    Take 1,000 Units by mouth once daily.    CYANOCOBALAMIN (VITAMIN B-12) 1000 MCG TABLET    Take 100 mcg by mouth once daily.    DOXYCYCLINE (VIBRAMYCIN) 100 MG CAP    Take 100 mg by mouth every 12 (twelve) hours.    FINASTERIDE (PROSCAR) 5 MG TABLET    Take 1 tablet by mouth nightly.    KETOROLAC (TORADOL) 10 MG TABLET    Take 10 mg by mouth every 8 (eight) hours as needed.    MECOBALAMIN, VITAMIN B12, 1,000 MCG CHEW    Take 1 tablet by mouth once daily.    NITROGLYCERIN (NITROSTAT) 0.4 MG SL TABLET    Place 0.4 mg under the tongue every 5 (five) minutes as needed.    PANTOPRAZOLE (PROTONIX) 40 MG TABLET    Take 40 mg by mouth once daily.    PREDNISONE (DELTASONE) 20 MG TABLET    Take 20 mg by mouth.    ROSUVASTATIN (CRESTOR) 10 MG TABLET    Take 1 tablet by mouth once daily.    TAMSULOSIN (FLOMAX) 0.4 MG CP24    Take 1 capsule by mouth every evening.    TRELEGY ELLIPTA 100-62.5-25 MCG DSDV    Inhale 1 puff into the lungs.    VENLAFAXINE (EFFEXOR-XR) 150 MG CP24    Take 1 capsule by  mouth once daily.    VITAMIN K2 100 MCG CAP    Take 1 capsule by mouth once daily.             Assessment:       1. Panlobular emphysema    2. Palpitations    3. Paroxysmal atrial fibrillation    4. Former smoker         Plan:     Problem List Items Addressed This Visit    None  Visit Diagnoses       Panlobular emphysema    -  Primary    Palpitations        Paroxysmal atrial fibrillation        Former smoker              Home BP MONITERING TO SEE IF NEEDS HIGHER DOSE AMLODIPINE.  Atypical chest pain had negative stress couple years ago. Need recent blood work. For chol for glucose.    The patients questions were answered, they verbalized understanding, and agreed with the treatment plan.     ALKA MCDONALD MD  [facility]

## 2023-10-24 ENCOUNTER — OFFICE VISIT (OUTPATIENT)
Dept: CARDIOLOGY | Facility: CLINIC | Age: 73
End: 2023-10-24
Payer: MEDICARE

## 2023-10-24 VITALS
DIASTOLIC BLOOD PRESSURE: 70 MMHG | BODY MASS INDEX: 21.55 KG/M2 | OXYGEN SATURATION: 94 % | SYSTOLIC BLOOD PRESSURE: 104 MMHG | WEIGHT: 167.88 LBS | HEART RATE: 96 BPM

## 2023-10-24 DIAGNOSIS — J43.1 PANLOBULAR EMPHYSEMA: Primary | ICD-10-CM

## 2023-10-24 DIAGNOSIS — Z99.81 OXYGEN DEPENDENT: ICD-10-CM

## 2023-10-24 DIAGNOSIS — R07.9 CHEST PAIN, UNSPECIFIED TYPE: ICD-10-CM

## 2023-10-24 DIAGNOSIS — Z87.891 FORMER SMOKER: ICD-10-CM

## 2023-10-24 DIAGNOSIS — R00.2 PALPITATIONS: ICD-10-CM

## 2023-10-24 DIAGNOSIS — I48.0 PAROXYSMAL ATRIAL FIBRILLATION: ICD-10-CM

## 2023-10-24 PROCEDURE — 99999 PR PBB SHADOW E&M-EST. PATIENT-LVL III: CPT | Mod: PBBFAC,,, | Performed by: GENERAL PRACTICE

## 2023-10-24 PROCEDURE — 99213 OFFICE O/P EST LOW 20 MIN: CPT | Mod: PBBFAC,PN | Performed by: GENERAL PRACTICE

## 2023-10-24 PROCEDURE — 99999 PR PBB SHADOW E&M-EST. PATIENT-LVL III: ICD-10-PCS | Mod: PBBFAC,,, | Performed by: GENERAL PRACTICE

## 2023-10-24 PROCEDURE — 99213 PR OFFICE/OUTPT VISIT, EST, LEVL III, 20-29 MIN: ICD-10-PCS | Mod: S$PBB,,, | Performed by: GENERAL PRACTICE

## 2023-10-24 PROCEDURE — 99213 OFFICE O/P EST LOW 20 MIN: CPT | Mod: S$PBB,,, | Performed by: GENERAL PRACTICE

## 2024-01-03 DIAGNOSIS — F17.210 NICOTINE DEPENDENCE, CIGARETTES, UNCOMPLICATED: Primary | ICD-10-CM

## 2024-01-18 ENCOUNTER — HOSPITAL ENCOUNTER (OUTPATIENT)
Dept: RADIOLOGY | Facility: HOSPITAL | Age: 74
Discharge: HOME | End: 2024-01-18
Attending: INTERNAL MEDICINE
Payer: MEDICARE

## 2024-01-18 DIAGNOSIS — F17.210 NICOTINE DEPENDENCE, CIGARETTES, UNCOMPLICATED: ICD-10-CM

## 2024-01-18 PROCEDURE — 71271 CT THORAX LUNG CANCER SCR C-: CPT | Mod: TC,PO

## 2024-07-03 DIAGNOSIS — F17.210 NICOTINE DEPENDENCE, CIGARETTES, UNCOMPLICATED: Primary | ICD-10-CM

## 2024-07-11 ENCOUNTER — HOSPITAL ENCOUNTER (EMERGENCY)
Facility: HOSPITAL | Age: 74
Discharge: HOME | End: 2024-07-12
Attending: EMERGENCY MEDICINE
Payer: MEDICARE

## 2024-07-11 DIAGNOSIS — J44.1 COPD EXACERBATION: Primary | ICD-10-CM

## 2024-07-11 DIAGNOSIS — R06.02 SHORTNESS OF BREATH: ICD-10-CM

## 2024-07-11 LAB
ALBUMIN SERPL BCP-MCNC: 4 G/DL (ref 3.5–5.2)
ALP SERPL-CCNC: 16 U/L (ref 55–135)
ALT SERPL W/O P-5'-P-CCNC: 11 U/L (ref 10–44)
ANION GAP SERPL CALC-SCNC: 6 MMOL/L (ref 8–16)
AST SERPL-CCNC: 11 U/L (ref 10–40)
BASOPHILS # BLD AUTO: 0.04 K/UL (ref 0–0.2)
BASOPHILS NFR BLD: 0.4 % (ref 0–1.9)
BILIRUB SERPL-MCNC: 0.3 MG/DL (ref 0.1–1)
BNP SERPL-MCNC: 22 PG/ML (ref 0–99)
BUN SERPL-MCNC: 23 MG/DL (ref 8–23)
CALCIUM SERPL-MCNC: 9.6 MG/DL (ref 8.7–10.5)
CHLORIDE SERPL-SCNC: 102 MMOL/L (ref 95–110)
CO2 SERPL-SCNC: 33 MMOL/L (ref 23–29)
CREAT SERPL-MCNC: 1.1 MG/DL (ref 0.5–1.4)
DIFFERENTIAL METHOD BLD: ABNORMAL
EOSINOPHIL # BLD AUTO: 0 K/UL (ref 0–0.5)
EOSINOPHIL NFR BLD: 0.3 % (ref 0–8)
ERYTHROCYTE [DISTWIDTH] IN BLOOD BY AUTOMATED COUNT: 13.3 % (ref 11.5–14.5)
EST. GFR  (NO RACE VARIABLE): >60 ML/MIN/1.73 M^2
GLUCOSE SERPL-MCNC: 86 MG/DL (ref 70–110)
HCT VFR BLD AUTO: 40.4 % (ref 40–54)
HGB BLD-MCNC: 13.2 G/DL (ref 14–18)
IMM GRANULOCYTES # BLD AUTO: 0.05 K/UL (ref 0–0.04)
IMM GRANULOCYTES NFR BLD AUTO: 0.6 % (ref 0–0.5)
INFLUENZA A, MOLECULAR: NEGATIVE
INFLUENZA B, MOLECULAR: NEGATIVE
LYMPHOCYTES # BLD AUTO: 1.4 K/UL (ref 1–4.8)
LYMPHOCYTES NFR BLD: 16 % (ref 18–48)
MCH RBC QN AUTO: 30.1 PG (ref 27–31)
MCHC RBC AUTO-ENTMCNC: 32.7 G/DL (ref 32–36)
MCV RBC AUTO: 92 FL (ref 82–98)
MONOCYTES # BLD AUTO: 0.9 K/UL (ref 0.3–1)
MONOCYTES NFR BLD: 10.4 % (ref 4–15)
NEUTROPHILS # BLD AUTO: 6.5 K/UL (ref 1.8–7.7)
NEUTROPHILS NFR BLD: 72.3 % (ref 38–73)
NRBC BLD-RTO: 0 /100 WBC
PLATELET # BLD AUTO: 321 K/UL (ref 150–450)
PMV BLD AUTO: 8.5 FL (ref 9.2–12.9)
POTASSIUM SERPL-SCNC: 3.7 MMOL/L (ref 3.5–5.1)
PROT SERPL-MCNC: 6.6 G/DL (ref 6–8.4)
RBC # BLD AUTO: 4.38 M/UL (ref 4.6–6.2)
SARS-COV-2 RDRP RESP QL NAA+PROBE: NEGATIVE
SODIUM SERPL-SCNC: 141 MMOL/L (ref 136–145)
SPECIMEN SOURCE: NORMAL
WBC # BLD AUTO: 9.02 K/UL (ref 3.9–12.7)

## 2024-07-11 PROCEDURE — 83880 ASSAY OF NATRIURETIC PEPTIDE: CPT | Performed by: NURSE PRACTITIONER

## 2024-07-11 PROCEDURE — 99285 EMERGENCY DEPT VISIT HI MDM: CPT | Mod: 25

## 2024-07-11 PROCEDURE — 84484 ASSAY OF TROPONIN QUANT: CPT | Performed by: NURSE PRACTITIONER

## 2024-07-11 PROCEDURE — 87502 INFLUENZA DNA AMP PROBE: CPT | Performed by: NURSE PRACTITIONER

## 2024-07-11 PROCEDURE — 80053 COMPREHEN METABOLIC PANEL: CPT | Performed by: NURSE PRACTITIONER

## 2024-07-11 PROCEDURE — U0002 COVID-19 LAB TEST NON-CDC: HCPCS | Performed by: NURSE PRACTITIONER

## 2024-07-11 PROCEDURE — 85025 COMPLETE CBC W/AUTO DIFF WBC: CPT | Performed by: NURSE PRACTITIONER

## 2024-07-12 VITALS
BODY MASS INDEX: 21.56 KG/M2 | DIASTOLIC BLOOD PRESSURE: 81 MMHG | RESPIRATION RATE: 20 BRPM | HEART RATE: 79 BPM | HEIGHT: 74 IN | TEMPERATURE: 98 F | SYSTOLIC BLOOD PRESSURE: 131 MMHG | OXYGEN SATURATION: 96 % | WEIGHT: 168 LBS

## 2024-07-12 LAB
OHS QRS DURATION: 90 MS
OHS QTC CALCULATION: 448 MS
TROPONIN I SERPL HS-MCNC: 11 PG/ML (ref 0–14.9)

## 2024-07-12 PROCEDURE — 36415 COLL VENOUS BLD VENIPUNCTURE: CPT | Performed by: NURSE PRACTITIONER

## 2024-07-12 PROCEDURE — 27000221 HC OXYGEN, UP TO 24 HOURS

## 2024-07-12 PROCEDURE — 63600175 PHARM REV CODE 636 W HCPCS: Performed by: EMERGENCY MEDICINE

## 2024-07-12 PROCEDURE — 25000242 PHARM REV CODE 250 ALT 637 W/ HCPCS: Performed by: EMERGENCY MEDICINE

## 2024-07-12 PROCEDURE — 96374 THER/PROPH/DIAG INJ IV PUSH: CPT

## 2024-07-12 PROCEDURE — 94640 AIRWAY INHALATION TREATMENT: CPT | Mod: XB

## 2024-07-12 PROCEDURE — 94761 N-INVAS EAR/PLS OXIMETRY MLT: CPT

## 2024-07-12 PROCEDURE — 99900031 HC PATIENT EDUCATION (STAT)

## 2024-07-12 RX ORDER — IPRATROPIUM BROMIDE AND ALBUTEROL SULFATE 2.5; .5 MG/3ML; MG/3ML
3 SOLUTION RESPIRATORY (INHALATION)
Status: COMPLETED | OUTPATIENT
Start: 2024-07-12 | End: 2024-07-12

## 2024-07-12 RX ORDER — METHYLPREDNISOLONE SOD SUCC 125 MG
125 VIAL (EA) INJECTION
Status: COMPLETED | OUTPATIENT
Start: 2024-07-12 | End: 2024-07-12

## 2024-07-12 RX ORDER — IPRATROPIUM BROMIDE AND ALBUTEROL SULFATE 2.5; .5 MG/3ML; MG/3ML
3 SOLUTION RESPIRATORY (INHALATION) EVERY 6 HOURS PRN
Qty: 75 ML | Refills: 0 | Status: SHIPPED | OUTPATIENT
Start: 2024-07-12 | End: 2025-07-12

## 2024-07-12 RX ORDER — AMOXICILLIN AND CLAVULANATE POTASSIUM 875; 125 MG/1; MG/1
1 TABLET, FILM COATED ORAL 2 TIMES DAILY
Qty: 14 TABLET | Refills: 0 | Status: SHIPPED | OUTPATIENT
Start: 2024-07-12

## 2024-07-12 RX ADMIN — IPRATROPIUM BROMIDE AND ALBUTEROL SULFATE 3 ML: 2.5; .5 SOLUTION RESPIRATORY (INHALATION) at 02:07

## 2024-07-12 RX ADMIN — METHYLPREDNISOLONE SODIUM SUCCINATE 125 MG: 125 INJECTION, POWDER, FOR SOLUTION INTRAMUSCULAR; INTRAVENOUS at 03:07

## 2024-07-12 NOTE — FIRST PROVIDER EVALUATION
" Emergency Department TeleTriage Encounter Note      CHIEF COMPLAINT    Chief Complaint   Patient presents with    Shortness of Breath     X2 weeks has had congestion, cough and SOB. Currently on doxycyline and steroids    Nasal Congestion    Cough       VITAL SIGNS   Initial Vitals [07/11/24 2054]   BP Pulse Resp Temp SpO2   110/86 108 20 98.1 °F (36.7 °C) 97 %      MAP       --            ALLERGIES    Review of patient's allergies indicates:   Allergen Reactions    Iodine and iodide containing products Anaphylaxis    Shellfish containing products Anaphylaxis    Iodine      Other reaction(s): Not available       PROVIDER TRIAGE NOTE  Verbal consent for the teletriage evaluation was given by the patient at the start of the evaluation.  All efforts will be made to maintain patient's privacy during the evaluation.      This is a teletriage evaluation of a 73 y.o. male presenting to the ED with c/o SOB (H/O COPD), non-productive cough for several weeks.  On prednisone 20 mg and Doxy for "a while". On home O2.  Limited physical exam via telehealth: The patient is awake, alert, answering questions appropriately and is not in respiratory distress.  As the Teletriage provider, I performed an initial assessment and ordered appropriate labs and imaging studies, if any, to facilitate the patient's care once placed in the ED. Once a room is available, care and a full evaluation will be completed by an alternate ED provider.  Any additional orders and the final disposition will be determined by that provider.  All imaging and labs will not be followed-up by the Teletriage Team, including myself.          ORDERS  Labs Reviewed   CBC W/ AUTO DIFFERENTIAL   COMPREHENSIVE METABOLIC PANEL   SARS-COV-2 RNA AMPLIFICATION, QUAL   B-TYPE NATRIURETIC PEPTIDE   INFLUENZA A AND B ANTIGEN       ED Orders (720h ago, onward)      Start Ordered     Status Ordering Provider    07/11/24 2112 07/11/24 2111  Saline lock IV  Once         Ordered " TYLER COLBERT    07/11/24 2111 07/11/24 2111  CBC Auto Differential  STAT         Ordered TYLER COLBERT    07/11/24 2111 07/11/24 2111  Comprehensive Metabolic Panel  STAT         Ordered TYLER COLBERT    07/11/24 2111 07/11/24 2111  Pulse Oximetry Continuous  Continuous         Ordered TYLER COLBERT    07/11/24 2111 07/11/24 2111  Cardiac Monitoring - Adult  Continuous         Ordered TYLER COLBERT    07/11/24 2111 07/11/24 2111  EKG 12-lead  Once         Acknowledged TYLER COLBERT    07/11/24 2111 07/11/24 2111  COVID-19 Rapid Screening  STAT         Ordered TYLER COLBERT    07/11/24 2111 07/11/24 2111  X-Ray Chest PA And Lateral  1 time imaging         Ordered TYLER COLBERT    07/11/24 2111 07/11/24 2111  Brain Natriuretic Peptide  STAT         Ordered TYLER COLBERT    07/11/24 2111 07/11/24 2111  Influenza antigen Nasopharyngeal Swab  Once         Ordered TYLER COLBERT SHAKEEL              Virtual Visit Note: The provider triage portion of this emergency department evaluation and documentation was performed via [employer], a HIPAA-compliant telemedicine application, in concert with a tele-presenter in the room. A face to face patient evaluation with one of my colleagues will occur once the patient is placed in an emergency department room.      DISCLAIMER: This note was prepared with [employer] voice recognition transcription software. Garbled syntax, mangled pronouns, and other bizarre constructions may be attributed to that software system.

## 2024-07-12 NOTE — ED PROVIDER NOTES
Encounter Date: 7/11/2024       History     Chief Complaint   Patient presents with    Shortness of Breath     X2 weeks has had congestion, cough and SOB. Currently on doxycyline and steroids    Nasal Congestion    Cough     HPI  Pt w/ PMHx emphysema/COPD on home oxygen, paroxysmal atrial fib, former smoker presents to ED with gradually worsening shortness a breath and worsening cough over the last 2 weeks.  He is prescribed chronic daily doxycycline and prednisone 20 mg by his pulmonologist which he has been taking.  He sometimes bumps himself up to 30 mg daily.  Using inhalers and nebulizers at home without significant improvement.  He denies known fever.  He states his back has been aching as well which made him concerned that he had pneumonia.  Denies any orthopnea or lower extremity edema.  Denies any unusual chest pain.  He states he feels the cold is stuck in his chest despite taking Mucinex.      Review of patient's allergies indicates:   Allergen Reactions    Iodine and iodide containing products Anaphylaxis    Shellfish containing products Anaphylaxis    Iodine      Other reaction(s): Not available     Past Medical History:   Diagnosis Date    BPH (benign prostatic hyperplasia)     Chondrocalcinosis of knee     Degenerative arthritis of left knee     Degenerative arthritis of left wrist     Oxygen dependent     2L    Soft tissue tumor     Tenosynovitis, wrist      Past Surgical History:   Procedure Laterality Date    ELBOW SURGERY      pinning (in high school)    TOTAL KNEE ARTHROPLASTY Left 11/10/2014    TOTAL KNEE ARTHROPLASTY Right 2006     Family History   Problem Relation Name Age of Onset    Lung disease Mother      Lung disease Father       Social History     Tobacco Use    Smoking status: Some Days     Current packs/day: 1.00     Types: Cigarettes    Smokeless tobacco: Never   Substance Use Topics    Alcohol use: No    Drug use: No     Review of Systems   Constitutional:  Negative for fever.    HENT:  Negative for sore throat.    Respiratory:  Positive for cough, shortness of breath and wheezing.    Cardiovascular:  Negative for chest pain.   Gastrointestinal:  Negative for nausea.   Genitourinary:  Negative for dysuria.   Musculoskeletal:  Positive for back pain.   Skin:  Negative for rash.   Neurological:  Negative for weakness.   Hematological:  Does not bruise/bleed easily.       Physical Exam     Initial Vitals [07/11/24 2054]   BP Pulse Resp Temp SpO2   110/86 108 20 98.1 °F (36.7 °C) 97 %      MAP       --         Physical Exam    Nursing note and vitals reviewed.  Constitutional: He appears well-developed and well-nourished. No distress.   HENT:   Head: Normocephalic and atraumatic.   Nose: Nose normal.   Eyes: Conjunctivae and EOM are normal. Pupils are equal, round, and reactive to light.   Neck: Neck supple.   Normal range of motion.  Cardiovascular:  Normal rate and regular rhythm.           Pulmonary/Chest: No respiratory distress.   End expiratory wheezing bilateral bases   Abdominal: Abdomen is soft. There is no abdominal tenderness. There is no rebound and no guarding.   Musculoskeletal:         General: No tenderness or edema. Normal range of motion.      Cervical back: Normal range of motion and neck supple.     Neurological: He is alert and oriented to person, place, and time. He has normal strength. No cranial nerve deficit. GCS score is 15. GCS eye subscore is 4. GCS verbal subscore is 5. GCS motor subscore is 6.   Skin: Skin is warm and dry. Capillary refill takes less than 2 seconds. No rash noted.   Psychiatric: He has a normal mood and affect. Thought content normal.         ED Course   Procedures  Labs Reviewed   CBC W/ AUTO DIFFERENTIAL - Abnormal; Notable for the following components:       Result Value    RBC 4.38 (*)     Hemoglobin 13.2 (*)     MPV 8.5 (*)     Immature Granulocytes 0.6 (*)     Immature Grans (Abs) 0.05 (*)     Lymph % 16.0 (*)     All other components within  normal limits   COMPREHENSIVE METABOLIC PANEL - Abnormal; Notable for the following components:    CO2 33 (*)     Alkaline Phosphatase 16 (*)     Anion Gap 6 (*)     All other components within normal limits   SARS-COV-2 RNA AMPLIFICATION, QUAL   B-TYPE NATRIURETIC PEPTIDE   INFLUENZA A AND B ANTIGEN    Narrative:     Specimen Source->Nasopharyngeal Swab   TROPONIN I HIGH SENSITIVITY   TROPONIN I HIGH SENSITIVITY     EKG Readings: (Independently Interpreted)   Sinus tachycardia, , normal intervals, nonspecific ST abnormalities.  No STEMI.       Imaging Results              X-Ray Chest PA And Lateral (Final result)  Result time 07/11/24 21:45:16      Final result by Breezy Langley MD (07/11/24 21:45:16)                   Impression:      Radiographic features suggestive of COPD/emphysema.  No definite radiographic evidence of superimposed acute intrathoracic process.      Electronically signed by: Breezy Langley MD  Date:    07/11/2024  Time:    21:45               Narrative:    EXAMINATION:  XR CHEST PA AND LATERAL    CLINICAL HISTORY:  SOB;    TECHNIQUE:  PA and lateral views of the chest were performed.    COMPARISON:  CT 01/18/2024, chest radiograph 07/24/2022    FINDINGS:  The cardiac silhouette is not significantly enlarged.  There is aortic atherosclerosis.  Lungs are hyperexpanded with flattening of the hemidiaphragms suggesting COPD/emphysema.  There is chronic coarse interstitial attenuation and a few scattered subsegmental opacities throughout the mid and lower lung zones likely reflecting atelectasis or scarring.  No new large confluent airspace consolidation appreciated.  No significant volume of pleural fluid or pneumothorax appreciated.  Osseous structures demonstrate degenerative changes of the spine.                                       Medications   methylPREDNISolone sodium succinate injection 125 mg (has no administration in time range)   albuterol-ipratropium 2.5 mg-0.5 mg/3 mL  nebulizer solution 3 mL (3 mLs Nebulization Given 7/12/24 0249)     Medical Decision Making  Patient presents to ED as above.  Initially mildly tachycardic with otherwise stable vitals.  Home oxygen stable on home 2 L. differential includes but not limited to COPD exacerbation, pneumonia, less likely ACS, PE.  EKG shows sinus tachycardia without evidence acute ischemia or arrhythmia.  Chest x-ray independently reviewed by me and shows no definite focal infiltrate.  Basic labs reviewed and significant for normal WBC count, stable electrolytes and renal function, normal BNP and high sensitivity troponin.  COVID and flu were negative.  Patient received DuoNeb x3 here and Solu-Medrol 125 mg IV with improvement.  He overall appears well and stable for discharge with outpatient management.  He has requesting a prescription for DuoNeb solution.  Will also add Augmentin for community-acquired pneumonia coverage.  He has Mucinex and Tessalon at home.  He was advised that he could bump his prednisone to 40 mg daily for the next 5 days.  He and his wife verbalized understanding.  Recommend PCP/pulmonology follow-up for rechecked.  ED return precautions discussed and provided.    Amount and/or Complexity of Data Reviewed  External Data Reviewed: notes.  Labs: ordered. Decision-making details documented in ED Course.  Radiology: ordered and independent interpretation performed. Decision-making details documented in ED Course.  ECG/medicine tests: ordered and independent interpretation performed. Decision-making details documented in ED Course.    Risk  OTC drugs.  Prescription drug management.                                      Clinical Impression:  Final diagnoses:  [R06.02] Shortness of breath  [J44.1] COPD exacerbation (Primary)          ED Disposition Condition    Discharge Stable          ED Prescriptions       Medication Sig Dispense Start Date End Date Auth. Provider    albuterol-ipratropium (DUO-NEB) 2.5 mg-0.5 mg/3 mL  nebulizer solution Take 3 mLs by nebulization every 6 (six) hours as needed for Wheezing. Rescue 75 mL 7/12/2024 7/12/2025 Ashanti Mills MD    amoxicillin-clavulanate 875-125mg (AUGMENTIN) 875-125 mg per tablet Take 1 tablet by mouth 2 (two) times daily. 14 tablet 7/12/2024 -- Ashanti Mills MD          Follow-up Information       Follow up With Specialties Details Why Contact Info    Dara Gonzalez MD Hospitalist, Internal Medicine Schedule an appointment as soon as possible for a visit   [address]  [address]  [place] [address]  [phone]      Timothy Zimmerman MD Pulmonary Disease Schedule an appointment as soon as possible for a visit   [address] [facility]  [place] [address]  [phone]               Ashanti Mills MD  07/12/24 0324

## 2024-07-12 NOTE — CARE UPDATE
07/12/24 0241   Patient Assessment/Suction   Level of Consciousness (AVPU) alert   Respiratory Effort Mild   Expansion/Accessory Muscles/Retractions no retractions   All Lung Fields Breath Sounds coarse   ANABELA Breath Sounds coarse;wheezes, expiratory   RUL Breath Sounds wheezes, expiratory;coarse   RML Breath Sounds coarse;wheezes, expiratory   Rhythm/Pattern, Respiratory no shortness of breath reported   Cough Frequency with stimulation   Cough Type no productive sputum   PRE-TX-O2   Device (Oxygen Therapy) nasal cannula   $ Is the patient on Low Flow Oxygen? Yes   Flow (L/min) (Oxygen Therapy) 2   SpO2 98 %   Pulse Oximetry Type Intermittent   $ Pulse Oximetry - Multiple Charge Pulse Oximetry - Multiple   Pulse 88   Resp 20   Positioning HOB elevated 45 degrees   Positioning   Head of Bed (HOB) Positioning HOB at 30-45 degrees   Aerosol Therapy   $ Aerosol Therapy Charges Aerosol Treatment   Daily Review of Necessity (SVN) completed   Respiratory Treatment Status (SVN) given   Treatment Route (SVN) mask;oxygen   Patient Position HOB elevated   Post Treatment Assessment (SVN) patient reports breathing improved   Signs of Intolerance (SVN) none   Education   $ Education Bronchodilator;15 min

## 2024-07-12 NOTE — CARE UPDATE
07/12/24 0241   Patient Assessment/Suction   Level of Consciousness (AVPU) alert   Respiratory Effort Mild   Expansion/Accessory Muscles/Retractions no retractions   All Lung Fields Breath Sounds clear   ANABELA Breath Sounds clear   RUL Breath Sounds wheezes, expiratory   Rhythm/Pattern, Respiratory no shortness of breath reported   Cough Frequency with stimulation   Cough Type no productive sputum   PRE-TX-O2   Device (Oxygen Therapy) nasal cannula   $ Is the patient on Low Flow Oxygen? Yes   Flow (L/min) (Oxygen Therapy) 2   SpO2 98 %   Pulse Oximetry Type Intermittent   $ Pulse Oximetry - Multiple Charge Pulse Oximetry - Multiple   Pulse 88   Resp 20   Positioning HOB elevated 45 degrees   Positioning   Head of Bed (HOB) Positioning HOB at 30-45 degrees   Aerosol Therapy   $ Aerosol Therapy Charges Aerosol Treatment   Daily Review of Necessity (SVN) completed   Respiratory Treatment Status (SVN) given   Treatment Route (SVN) mask;oxygen   Patient Position HOB elevated   Post Treatment Assessment (SVN) patient reports breathing improved   Signs of Intolerance (SVN) none   Education   $ Education Bronchodilator;15 min

## 2024-10-14 ENCOUNTER — OFFICE VISIT (OUTPATIENT)
Dept: PULMONOLOGY | Facility: CLINIC | Age: 74
End: 2024-10-14
Payer: MEDICARE

## 2024-10-14 VITALS
SYSTOLIC BLOOD PRESSURE: 116 MMHG | WEIGHT: 170.63 LBS | BODY MASS INDEX: 21.9 KG/M2 | DIASTOLIC BLOOD PRESSURE: 60 MMHG | OXYGEN SATURATION: 94 % | HEIGHT: 74 IN

## 2024-10-14 DIAGNOSIS — G47.33 OSA (OBSTRUCTIVE SLEEP APNEA): ICD-10-CM

## 2024-10-14 DIAGNOSIS — J44.9 CHRONIC OBSTRUCTIVE PULMONARY DISEASE, UNSPECIFIED COPD TYPE: Primary | ICD-10-CM

## 2024-10-14 DIAGNOSIS — J44.1 COPD EXACERBATION: Primary | ICD-10-CM

## 2024-10-14 DIAGNOSIS — F17.210 CIGARETTE SMOKER: ICD-10-CM

## 2024-10-14 DIAGNOSIS — Z79.52 LONG TERM SYSTEMIC STEROID USER: ICD-10-CM

## 2024-10-14 PROCEDURE — 99204 OFFICE O/P NEW MOD 45 MIN: CPT | Mod: S$GLB,,, | Performed by: INTERNAL MEDICINE

## 2024-10-14 RX ORDER — IPRATROPIUM BROMIDE AND ALBUTEROL SULFATE 2.5; .5 MG/3ML; MG/3ML
3 SOLUTION RESPIRATORY (INHALATION) EVERY 6 HOURS PRN
Qty: 150 EACH | Refills: 11 | Status: SHIPPED | OUTPATIENT
Start: 2024-10-14 | End: 2024-10-14 | Stop reason: SDUPTHER

## 2024-10-14 RX ORDER — ROFLUMILAST 250 UG/1
1 TABLET ORAL DAILY
Qty: 30 TABLET | Refills: 2 | Status: SHIPPED | OUTPATIENT
Start: 2024-10-14

## 2024-10-14 RX ORDER — PREDNISONE 20 MG/1
40 TABLET ORAL DAILY
Qty: 60 TABLET | Refills: 2 | Status: SHIPPED | OUTPATIENT
Start: 2024-10-14

## 2024-10-14 RX ORDER — IPRATROPIUM BROMIDE AND ALBUTEROL SULFATE 2.5; .5 MG/3ML; MG/3ML
3 SOLUTION RESPIRATORY (INHALATION) EVERY 6 HOURS PRN
Qty: 150 EACH | Refills: 11 | Status: SHIPPED | OUTPATIENT
Start: 2024-10-14 | End: 2025-10-14

## 2024-10-14 NOTE — PROGRESS NOTES
"New Office Visit/Consultation Note *    Patient Name: Tal Vela  MRN: [id number]  : 1950      Reason for visit: COPD, LUIS    HPI:     10/14/2024 - Pt transferring care because Dr Zimmerman retiring.  He has known COPD  ("severe", FEV1 46% in past), recently has had increased SOB, cough with mucus (clear), + nasal congestion, he is on prednisone 20 mg normally but has been taking 40 for the last few days,  TRELEGY 200, using mucinex.  Normally uses O2 2-4 LPM.  He has not been on daliresp.   He is still smoking about 7-8 cigarettes and we discussed this at length.  Last CT chest 2024   which was OK.  His performance ability is not good and we discussed possibly getting involved with pulmonary rehab and he will consider.  He does have some back discomfort at times which may diaphragmatic in nature (reviewed last CXR with hyperinflation and flattened diaphragms). He also has LUIS and is on CPAP and reports compliance and benefit and does use it from time to time during the day.         Past Medical History    Past Medical History:   Diagnosis Date    BPH (benign prostatic hyperplasia)     Chondrocalcinosis of knee     Degenerative arthritis of left knee     Degenerative arthritis of left wrist     Oxygen dependent     2L    Soft tissue tumor     Tenosynovitis, wrist        Past Surgical History    Past Surgical History:   Procedure Laterality Date    ELBOW SURGERY      pinning (in high school)    TOTAL KNEE ARTHROPLASTY Left 11/10/2014    TOTAL KNEE ARTHROPLASTY Right        Medications      Current Outpatient Medications:     AEROCHAMBER PLUS FLOW-VU, , Disp: , Rfl:     albuterol-ipratropium (DUO-NEB) 2.5 mg-0.5 mg/3 mL nebulizer solution, Take 3 mLs by nebulization every 6 (six) hours as needed for Wheezing. Rescue, Disp: 150 each, Rfl: 11    amLODIPine (NORVASC) 2.5 MG tablet, Take 2.5 mg by mouth every evening., Disp: , Rfl:     amoxicillin-clavulanate 875-125mg (AUGMENTIN) 875-125 mg per tablet, " Take 1 tablet by mouth 2 (two) times daily., Disp: 14 tablet, Rfl: 0    aspirin (ECOTRIN) 325 MG EC tablet, Take 1 tablet by mouth every morning., Disp: , Rfl:     benzonatate (TESSALON) 200 MG capsule, Take by mouth., Disp: , Rfl:     BREZTRI AEROSPHERE 160-9-4.8 mcg/actuation HFAA, Inhale 1 puff into the lungs 2 (two) times a day., Disp: , Rfl:     cetirizine (ZYRTEC) 10 mg Cap, Take 10 mg by mouth., Disp: , Rfl:     cholecalciferol, vitamin D3, (VITAMIN D3) 25 mcg (1,000 unit) capsule, Take 1,000 Units by mouth once daily., Disp: , Rfl:     cyanocobalamin (VITAMIN B-12) 1000 MCG tablet, Take 100 mcg by mouth once daily., Disp: , Rfl:     doxycycline (VIBRAMYCIN) 100 MG Cap, Take 100 mg by mouth every 12 (twelve) hours., Disp: , Rfl:     finasteride (PROSCAR) 5 mg tablet, Take 1 tablet by mouth nightly., Disp: , Rfl:     ketorolac (TORADOL) 10 mg tablet, Take 10 mg by mouth every 8 (eight) hours as needed., Disp: , Rfl:     mecobalamin, vitamin B12, 1,000 mcg Chew, Take 1 tablet by mouth once daily., Disp: , Rfl:     nitroGLYCERIN (NITROSTAT) 0.4 MG SL tablet, Place 0.4 mg under the tongue every 5 (five) minutes as needed., Disp: , Rfl:     pantoprazole (PROTONIX) 40 MG tablet, Take 40 mg by mouth once daily., Disp: , Rfl:     predniSONE (DELTASONE) 20 MG tablet, Take 20 mg by mouth., Disp: , Rfl:     predniSONE (DELTASONE) 20 MG tablet, Take 2 tablets (40 mg total) by mouth once daily., Disp: 60 tablet, Rfl: 2    roflumilast (DALIRESP) 250 mcg Tab, Take 1 tablet (250 mcg total) by mouth once daily., Disp: 30 tablet, Rfl: 2    rosuvastatin (CRESTOR) 10 MG tablet, Take 1 tablet by mouth once daily., Disp: , Rfl:     tamsulosin (FLOMAX) 0.4 mg Cp24, Take 1 capsule by mouth every evening., Disp: , Rfl:     TRELEGY ELLIPTA 100-62.5-25 mcg DsDv, Inhale 1 puff into the lungs. Pt is on the 200mg, Disp: , Rfl:     venlafaxine (EFFEXOR-XR) 150 MG Cp24, Take 1 capsule by mouth once daily., Disp: , Rfl:     vitamin K2 100  "mcg Cap, Take 1 capsule by mouth once daily., Disp: , Rfl:     Allergies    Review of patient's allergies indicates:   Allergen Reactions    Iodine Anaphylaxis     Other reaction(s): Not available    Iodine and iodide containing products Anaphylaxis    Shellfish containing products Anaphylaxis       SocHx    Social History     Tobacco Use   Smoking Status Some Days    Current packs/day: 1.00    Types: Cigarettes   Smokeless Tobacco Never       Social History     Substance and Sexual Activity   Alcohol Use No       Drug Use - no  Occupation - retired, lots of jobs - waste water, AC, body work  Asbestos exposure - no  Pets - na    FMHx    Family History   Problem Relation Name Age of Onset    Lung disease Mother      Lung disease Father           Review of Systems  Review of Systems   Constitutional:  Positive for malaise/fatigue. Negative for chills, diaphoresis, fever and weight loss.        + sleep apnea   HENT:  Negative for congestion.    Eyes:  Negative for pain.   Respiratory:  Positive for cough, sputum production, shortness of breath and wheezing. Negative for hemoptysis and stridor.    Cardiovascular:  Negative for chest pain, palpitations, orthopnea, claudication, leg swelling and PND.   Gastrointestinal:  Negative for abdominal pain, constipation, diarrhea, heartburn, nausea and vomiting.   Genitourinary:  Negative for dysuria, frequency and urgency.   Musculoskeletal:  Negative for falls and myalgias.   Neurological:  Positive for weakness. Negative for sensory change and focal weakness.   Psychiatric/Behavioral:  Negative for depression, substance abuse and suicidal ideas. The patient is not nervous/anxious.        Physical Exam    Vitals:    10/14/24 0928   BP: 116/60   SpO2: (!) 94%   Weight: 77.4 kg (170 lb 9.6 oz)   Height: 6' 2" (1.88 m)       Physical Exam  Vitals and nursing note reviewed.   Constitutional:       General: He is not in acute distress.     Appearance: He is well-developed. He is not " ill-appearing, toxic-appearing or diaphoretic.   HENT:      Head: Normocephalic and atraumatic.      Right Ear: External ear normal.      Left Ear: External ear normal.      Nose: Nose normal.      Mouth/Throat:      Mouth: Mucous membranes are moist.      Pharynx: Oropharynx is clear. No oropharyngeal exudate or posterior oropharyngeal erythema.   Eyes:      General: No scleral icterus.        Right eye: No discharge.         Left eye: No discharge.      Extraocular Movements: Extraocular movements intact.      Conjunctiva/sclera: Conjunctivae normal.      Pupils: Pupils are equal, round, and reactive to light.   Neck:      Thyroid: No thyromegaly.      Vascular: No JVD.      Trachea: No tracheal deviation.   Cardiovascular:      Rate and Rhythm: Normal rate and regular rhythm.      Heart sounds: Normal heart sounds. No murmur heard.     No friction rub. No gallop.   Pulmonary:      Effort: Pulmonary effort is normal. No respiratory distress.      Breath sounds: No stridor. Wheezing present. No rhonchi or rales.   Chest:      Chest wall: No tenderness.   Abdominal:      General: Bowel sounds are normal. There is no distension.      Palpations: Abdomen is soft.      Tenderness: There is no abdominal tenderness. There is no guarding.   Musculoskeletal:         General: No tenderness. Normal range of motion.      Cervical back: Normal range of motion and neck supple. No rigidity.      Right lower leg: No edema.      Left lower leg: No edema.   Lymphadenopathy:      Cervical: No cervical adenopathy.   Skin:     General: Skin is warm.      Capillary Refill: Capillary refill takes less than 2 seconds.   Neurological:      General: No focal deficit present.      Mental Status: He is alert and oriented to person, place, and time. Mental status is at baseline.      Cranial Nerves: No cranial nerve deficit.      Motor: No weakness.      Gait: Gait normal.      Deep Tendon Reflexes: Reflexes are normal and symmetric.    Psychiatric:         Mood and Affect: Mood normal.         Behavior: Behavior normal.         Thought Content: Thought content normal.         Judgment: Judgment normal.         Labs    Lab Results   Component Value Date    WBC 9.02 07/11/2024    HGB 13.2 (L) 07/11/2024    HCT 40.4 07/11/2024     07/11/2024       Sodium   Date Value Ref Range Status   07/11/2024 141 136 - 145 mmol/L Final     Potassium   Date Value Ref Range Status   07/11/2024 3.7 3.5 - 5.1 mmol/L Final     Chloride   Date Value Ref Range Status   07/11/2024 102 95 - 110 mmol/L Final     CO2   Date Value Ref Range Status   07/11/2024 33 (H) 23 - 29 mmol/L Final     Glucose   Date Value Ref Range Status   07/11/2024 86 70 - 110 mg/dL Final     BUN   Date Value Ref Range Status   07/11/2024 23 8 - 23 mg/dL Final     Creatinine   Date Value Ref Range Status   07/11/2024 1.1 0.5 - 1.4 mg/dL Final     Calcium   Date Value Ref Range Status   07/11/2024 9.6 8.7 - 10.5 mg/dL Final     Total Protein   Date Value Ref Range Status   07/11/2024 6.6 6.0 - 8.4 g/dL Final     Albumin   Date Value Ref Range Status   07/11/2024 4.0 3.5 - 5.2 g/dL Final     Total Bilirubin   Date Value Ref Range Status   07/11/2024 0.3 0.1 - 1.0 mg/dL Final     Comment:     For infants and newborns, interpretation of results should be based  on gestational age, weight and in agreement with clinical  observations.    Premature Infant recommended reference ranges:  Up to 24 hours.............<8.0 mg/dL  Up to 48 hours............<12.0 mg/dL  3-5 days..................<15.0 mg/dL  6-29 days.................<15.0 mg/dL       Alkaline Phosphatase   Date Value Ref Range Status   07/11/2024 16 (L) 55 - 135 U/L Final     AST   Date Value Ref Range Status   07/11/2024 11 10 - 40 U/L Final     ALT   Date Value Ref Range Status   07/11/2024 11 10 - 44 U/L Final     Anion Gap   Date Value Ref Range Status   07/11/2024 6 (L) 8 - 16 mmol/L Final  "      Xrays        Impression/Plan    Problem List Items Addressed This Visit          Pulmonary    COPD (chronic obstructive pulmonary disease) - Primary     I suspect he has severe to very severe disease now  Continue TRELEGY and prn COMBIVENT  Continue O2  Add Daliresp to try and help with mucus production  Prednisone 60 x 5, 40 x 5 then 20/d but hope to eventually go lower  Pulmonary rehab would be useful and we will see what he decides  Would like to redo PFT and see where we are  He needs to stop smoking  Eventually want to stopthe continuous doxycyline and porbably transition to po azithromycin  Overall prognosis is poor  RTC 1 month            Endocrine    Long term systemic steroid user     As above will hope to eventually get him lower than 20 mg/d whch he has been on for a long time            Other    Cigarette smoker     Currently smoking about 1/2 packs per day  40+ pack years  I have counseled pt for 3-5 minutes regarding cigarette cessation.  This has included the need to stop smoking as well as strategies, including but not limited to "cold turkey", CHANTIX (including risks and benefits of the drug), nicotine replacement and WELLBUTRIN.  Last LDSCT chest noted and will need repeat 1/2025           LUIS (obstructive sleep apnea)     Continue present PAP therapy  Pt to call if they have any trouble with their machines  Discussed with pt about signs and symptoms to watch for  Will refill supplies as needed  May have consider transition to BiPAP or NIPPV due to his severe lung disease              I have spent about 50 minutes with the patient taking the history and examining the patient.  We have discussed the diagnoses and current plan and all questions have been answered.  We have discussed the follow up plan.  The patient and family (if present) know to contact the office with any questions they may have.        Breezy Gastelum MD    "

## 2024-10-14 NOTE — ASSESSMENT & PLAN NOTE
I suspect he has severe to very severe disease now  Continue TRELEGY and prn COMBIVENT  Continue O2  Add Daliresp to try and help with mucus production  Prednisone 60 x 5, 40 x 5 then 20/d but hope to eventually go lower  Pulmonary rehab would be useful and we will see what he decides  Would like to redo PFT and see where we are  He needs to stop smoking  Eventually want to stopthe continuous doxycyline and porbably transition to po azithromycin  Overall prognosis is poor  RTC 1 month

## 2024-10-14 NOTE — TELEPHONE ENCOUNTER
Resent to MD to sign for it to go to [facility].   ----- Message from Breezy Gastelum MD sent at 10/14/2024 10:21 AM CDT -----  The neb med order needs to go to [facility] please

## 2024-10-14 NOTE — ASSESSMENT & PLAN NOTE
"Currently smoking about 1/2 packs per day  40+ pack years  I have counseled pt for 3-5 minutes regarding cigarette cessation.  This has included the need to stop smoking as well as strategies, including but not limited to "cold turkey", CHANTIX (including risks and benefits of the drug), nicotine replacement and WELLBUTRIN.  Last LDSCT chest noted and will need repeat 1/2025    "

## 2024-10-14 NOTE — ASSESSMENT & PLAN NOTE
Continue present PAP therapy  Pt to call if they have any trouble with their machines  Discussed with pt about signs and symptoms to watch for  Will refill supplies as needed  May have consider transition to BiPAP or NIPPV due to his severe lung disease

## 2024-12-17 RX ORDER — FLUTICASONE PROPIONATE 50 MCG
SPRAY, SUSPENSION (ML) NASAL
COMMUNITY
Start: 2023-11-05

## 2024-12-17 RX ORDER — ALBUTEROL SULFATE 90 UG/1
2 INHALANT RESPIRATORY (INHALATION) EVERY 4 HOURS PRN
COMMUNITY
Start: 2024-04-04

## 2024-12-18 ENCOUNTER — OFFICE VISIT (OUTPATIENT)
Dept: PULMONOLOGY | Facility: CLINIC | Age: 74
End: 2024-12-18
Payer: MEDICARE

## 2024-12-18 VITALS — BODY MASS INDEX: 22.21 KG/M2 | WEIGHT: 173 LBS | OXYGEN SATURATION: 99 % | HEART RATE: 99 BPM

## 2024-12-18 DIAGNOSIS — G47.33 OSA (OBSTRUCTIVE SLEEP APNEA): ICD-10-CM

## 2024-12-18 DIAGNOSIS — J44.9 CHRONIC OBSTRUCTIVE PULMONARY DISEASE, UNSPECIFIED COPD TYPE: Primary | ICD-10-CM

## 2024-12-18 DIAGNOSIS — J44.1 COPD EXACERBATION: ICD-10-CM

## 2024-12-18 PROCEDURE — 99214 OFFICE O/P EST MOD 30 MIN: CPT | Mod: S$GLB,,, | Performed by: INTERNAL MEDICINE

## 2024-12-18 RX ORDER — IPRATROPIUM BROMIDE AND ALBUTEROL SULFATE 2.5; .5 MG/3ML; MG/3ML
3 SOLUTION RESPIRATORY (INHALATION) EVERY 6 HOURS PRN
Qty: 450 EACH | Refills: 3 | Status: SHIPPED | OUTPATIENT
Start: 2024-12-18

## 2024-12-18 NOTE — PROGRESS NOTES
"Office Visit Note *    Patient Name: Tal Vela  MRN: [id number]  : 1950      Reason for visit: COPD, LUIS    HPI:     10/14/2024 - Pt transferring care because Dr Zimmerman retiring.  He has known COPD  ("severe", FEV1 46% in past), recently has had increased SOB, cough with mucus (clear), + nasal congestion, he is on prednisone 20 mg normally but has been taking 40 for the last few days,  TRELEGY 200, using mucinex.  Normally uses O2 2-4 LPM.  He has not been on daliresp.   He is still smoking about 7-8 cigarettes and we discussed this at length.  Last CT chest 2024   which was OK.  His performance ability is not good and we discussed possibly getting involved with pulmonary rehab and he will consider.  He does have some back discomfort at times which may diaphragmatic in nature (reviewed last CXR with hyperinflation and flattened diaphragms). He also has LUIS and is on CPAP and reports compliance and benefit and does use it from time to time during the day.       2024 - Here for follow up visit.  Patient is currently stable on present medications with no recent increases in their symptoms or use of rescue medications.  Since our last visit there have been no hospitalizations or ER visits for their respiratory issues and there does not seem to be anything to suggest unrecognized exacerbations.  I have reviewed the medical regimen and re-educated the pt on the role of rescue and controlling medications.  Inhaler technique and understanding seems adequate.  The patient reports no issues with any of there medications for their COPD.  Refills will be taken care of as needed.  All questions answered.  We have discussed potential future therapies or options as appropriate.  He is on prednisone 20 mg/d.  Here for follow up visit. Patient is being seen for sleep apnea and therapy.  They report no issues with their machine.  They report good compliance with the therapy and feel that they are benefiting " from it.  Discussed alternative therapies/options as appropriate.  Discussed diet, weight and exercise as appropriate.  All questions answered. Have reviewed compliance report if available, if report is not available at the time of the visit we have requested the report from the DME and will review when available.  He is still smoking 5-8 cigarettes per day and we discussed this.   We discussed and he wants to look into dupixent    Past Medical History    Past Medical History:   Diagnosis Date    BPH (benign prostatic hyperplasia)     Chondrocalcinosis of knee     Degenerative arthritis of left knee     Degenerative arthritis of left wrist     Oxygen dependent     2L    Soft tissue tumor     Tenosynovitis, wrist        Past Surgical History    Past Surgical History:   Procedure Laterality Date    ELBOW SURGERY      pinning (in high school)    TOTAL KNEE ARTHROPLASTY Left 11/10/2014    TOTAL KNEE ARTHROPLASTY Right 2006       Medications      Current Outpatient Medications:     AEROCHAMBER PLUS FLOW-VU, , Disp: , Rfl:     albuterol (PROVENTIL/VENTOLIN HFA) 90 mcg/actuation inhaler, Inhale 2 puffs into the lungs every 4 (four) hours as needed., Disp: , Rfl:     amLODIPine (NORVASC) 2.5 MG tablet, Take 2.5 mg by mouth every evening., Disp: , Rfl:     aspirin (ECOTRIN) 325 MG EC tablet, Take 1 tablet by mouth every morning., Disp: , Rfl:     benzonatate (TESSALON) 200 MG capsule, Take by mouth., Disp: , Rfl:     BREZTRI AEROSPHERE 160-9-4.8 mcg/actuation HFAA, Inhale 1 puff into the lungs 2 (two) times a day., Disp: , Rfl:     cetirizine (ZYRTEC) 10 mg Cap, Take 10 mg by mouth., Disp: , Rfl:     cholecalciferol, vitamin D3, (VITAMIN D3) 25 mcg (1,000 unit) capsule, Take 1,000 Units by mouth once daily., Disp: , Rfl:     cyanocobalamin (VITAMIN B-12) 1000 MCG tablet, Take 100 mcg by mouth once daily., Disp: , Rfl:     doxycycline (VIBRAMYCIN) 100 MG Cap, Take 100 mg by mouth every 12 (twelve) hours., Disp: , Rfl:      finasteride (PROSCAR) 5 mg tablet, Take 1 tablet by mouth nightly., Disp: , Rfl:     fluticasone propionate (FLONASE) 50 mcg/actuation nasal spray, , Disp: , Rfl:     ketorolac (TORADOL) 10 mg tablet, Take 10 mg by mouth every 8 (eight) hours as needed., Disp: , Rfl:     mecobalamin, vitamin B12, 1,000 mcg Chew, Take 1 tablet by mouth once daily., Disp: , Rfl:     nitroGLYCERIN (NITROSTAT) 0.4 MG SL tablet, Place 0.4 mg under the tongue every 5 (five) minutes as needed., Disp: , Rfl:     pantoprazole (PROTONIX) 40 MG tablet, Take 40 mg by mouth once daily., Disp: , Rfl:     predniSONE (DELTASONE) 20 MG tablet, Take 20 mg by mouth., Disp: , Rfl:     predniSONE (DELTASONE) 20 MG tablet, Take 2 tablets (40 mg total) by mouth once daily., Disp: 60 tablet, Rfl: 2    roflumilast (DALIRESP) 250 mcg Tab, Take 1 tablet (250 mcg total) by mouth once daily., Disp: 30 tablet, Rfl: 2    rosuvastatin (CRESTOR) 10 MG tablet, Take 1 tablet by mouth once daily., Disp: , Rfl:     tamsulosin (FLOMAX) 0.4 mg Cp24, Take 1 capsule by mouth every evening., Disp: , Rfl:     TRELEGY ELLIPTA 100-62.5-25 mcg DsDv, Inhale 1 puff into the lungs. Pt is on the 200mg, Disp: , Rfl:     venlafaxine (EFFEXOR-XR) 150 MG Cp24, Take 1 capsule by mouth once daily., Disp: , Rfl:     vitamin K2 100 mcg Cap, Take 1 capsule by mouth once daily., Disp: , Rfl:     albuterol-ipratropium (DUO-NEB) 2.5 mg-0.5 mg/3 mL nebulizer solution, Take 3 mLs by nebulization every 6 (six) hours as needed for Wheezing. Rescue, Disp: 450 each, Rfl: 3    amoxicillin-clavulanate 875-125mg (AUGMENTIN) 875-125 mg per tablet, Take 1 tablet by mouth 2 (two) times daily. (Patient not taking: Reported on 12/18/2024), Disp: 14 tablet, Rfl: 0    Allergies    Review of patient's allergies indicates:   Allergen Reactions    Iodine Anaphylaxis     Other reaction(s): Not available    Iodine and iodide containing products Anaphylaxis    Shellfish containing products Anaphylaxis        SocHx    Social History     Tobacco Use   Smoking Status Some Days    Current packs/day: 1.00    Types: Cigarettes   Smokeless Tobacco Never       Social History     Substance and Sexual Activity   Alcohol Use No       Drug Use - no  Occupation - retired, lots of jobs - waste water, AC, body work  Asbestos exposure - no  Pets - na    FMHx    Family History   Problem Relation Name Age of Onset    Lung disease Mother      Lung disease Father           Review of Systems  Review of Systems   Constitutional:  Positive for malaise/fatigue. Negative for chills, diaphoresis, fever and weight loss.        + sleep apnea   HENT:  Negative for congestion.    Eyes:  Negative for pain.   Respiratory:  Positive for cough, sputum production, shortness of breath and wheezing. Negative for hemoptysis and stridor.    Cardiovascular:  Negative for chest pain, palpitations, orthopnea, claudication, leg swelling and PND.   Gastrointestinal:  Negative for abdominal pain, constipation, diarrhea, heartburn, nausea and vomiting.   Genitourinary:  Negative for dysuria, frequency and urgency.   Musculoskeletal:  Negative for falls and myalgias.   Neurological:  Positive for weakness. Negative for sensory change and focal weakness.   Psychiatric/Behavioral:  Negative for depression, substance abuse and suicidal ideas. The patient is not nervous/anxious.        Physical Exam    Vitals:    12/18/24 0950   BP: (P) 118/70   BP Location: Left arm   Patient Position: Sitting   Pulse: 99   SpO2: 99%   Weight: 78.5 kg (173 lb)       Physical Exam  Vitals and nursing note reviewed.   Constitutional:       General: He is not in acute distress.     Appearance: He is well-developed. He is not ill-appearing, toxic-appearing or diaphoretic.   HENT:      Head: Normocephalic and atraumatic.      Right Ear: External ear normal.      Left Ear: External ear normal.      Nose: Nose normal.      Mouth/Throat:      Mouth: Mucous membranes are moist.       Pharynx: Oropharynx is clear. No oropharyngeal exudate or posterior oropharyngeal erythema.   Eyes:      General: No scleral icterus.        Right eye: No discharge.         Left eye: No discharge.      Extraocular Movements: Extraocular movements intact.      Conjunctiva/sclera: Conjunctivae normal.      Pupils: Pupils are equal, round, and reactive to light.   Neck:      Thyroid: No thyromegaly.      Vascular: No JVD.      Trachea: No tracheal deviation.   Cardiovascular:      Rate and Rhythm: Normal rate and regular rhythm.      Heart sounds: Normal heart sounds. No murmur heard.     No friction rub. No gallop.   Pulmonary:      Effort: Pulmonary effort is normal. No respiratory distress.      Breath sounds: No stridor. Wheezing present. No rhonchi or rales.   Chest:      Chest wall: No tenderness.   Abdominal:      General: Bowel sounds are normal. There is no distension.      Palpations: Abdomen is soft.      Tenderness: There is no abdominal tenderness. There is no guarding.   Musculoskeletal:         General: No tenderness. Normal range of motion.      Cervical back: Normal range of motion and neck supple. No rigidity.      Right lower leg: No edema.      Left lower leg: No edema.   Lymphadenopathy:      Cervical: No cervical adenopathy.   Skin:     General: Skin is warm.      Capillary Refill: Capillary refill takes less than 2 seconds.   Neurological:      General: No focal deficit present.      Mental Status: He is alert and oriented to person, place, and time. Mental status is at baseline.      Cranial Nerves: No cranial nerve deficit.      Motor: No weakness.      Gait: Gait normal.      Deep Tendon Reflexes: Reflexes are normal and symmetric.   Psychiatric:         Mood and Affect: Mood normal.         Behavior: Behavior normal.         Thought Content: Thought content normal.         Judgment: Judgment normal.         Labs    Lab Results   Component Value Date    WBC 9.02 07/11/2024    HGB 13.2 (L)  07/11/2024    HCT 40.4 07/11/2024     07/11/2024       Sodium   Date Value Ref Range Status   07/11/2024 141 136 - 145 mmol/L Final     Potassium   Date Value Ref Range Status   07/11/2024 3.7 3.5 - 5.1 mmol/L Final     Chloride   Date Value Ref Range Status   07/11/2024 102 95 - 110 mmol/L Final     CO2   Date Value Ref Range Status   07/11/2024 33 (H) 23 - 29 mmol/L Final     Glucose   Date Value Ref Range Status   07/11/2024 86 70 - 110 mg/dL Final     BUN   Date Value Ref Range Status   07/11/2024 23 8 - 23 mg/dL Final     Creatinine   Date Value Ref Range Status   07/11/2024 1.1 0.5 - 1.4 mg/dL Final     Calcium   Date Value Ref Range Status   07/11/2024 9.6 8.7 - 10.5 mg/dL Final     Total Protein   Date Value Ref Range Status   07/11/2024 6.6 6.0 - 8.4 g/dL Final     Albumin   Date Value Ref Range Status   07/11/2024 4.0 3.5 - 5.2 g/dL Final     Total Bilirubin   Date Value Ref Range Status   07/11/2024 0.3 0.1 - 1.0 mg/dL Final     Comment:     For infants and newborns, interpretation of results should be based  on gestational age, weight and in agreement with clinical  observations.    Premature Infant recommended reference ranges:  Up to 24 hours.............<8.0 mg/dL  Up to 48 hours............<12.0 mg/dL  3-5 days..................<15.0 mg/dL  6-29 days.................<15.0 mg/dL       Alkaline Phosphatase   Date Value Ref Range Status   07/11/2024 16 (L) 55 - 135 U/L Final     AST   Date Value Ref Range Status   07/11/2024 11 10 - 40 U/L Final     ALT   Date Value Ref Range Status   07/11/2024 11 10 - 44 U/L Final     Anion Gap   Date Value Ref Range Status   07/11/2024 6 (L) 8 - 16 mmol/L Final       Xrays        Impression/Plan    Problem List Items Addressed This Visit          Pulmonary    COPD (chronic obstructive pulmonary disease) - Primary     I suspect he has severe to very severe disease now  Continue TRELEGY and prn COMBIVENT  Continue O2  Add Daliresp to try and help with mucus  production  Prednisone 20 mg/d  Pulmonary rehab would be useful and we will see what he decides  Would like to redo PFT and see where we are  He needs to stop smoking  Eventually want to stopthe continuous doxycyline and porbably transition to po azithromycin  Overall prognosis is poor  Will look into dupixent  RTC 2 month         Relevant Medications    albuterol-ipratropium (DUO-NEB) 2.5 mg-0.5 mg/3 mL nebulizer solution       Other    LUIS (obstructive sleep apnea)     Continue present PAP therapy  Pt to call if they have any trouble with their machines  Discussed with pt about signs and symptoms to watch for  Will refill supplies as needed  May have consider transition to BiPAP or NIPPV due to his severe lung disease            Other Visit Diagnoses       COPD exacerbation        Relevant Medications    albuterol-ipratropium (DUO-NEB) 2.5 mg-0.5 mg/3 mL nebulizer solution                    Breezy Gastelum MD

## 2024-12-18 NOTE — ASSESSMENT & PLAN NOTE
I suspect he has severe to very severe disease now  Continue TRELEGY and prn COMBIVENT  Continue O2  Add Daliresp to try and help with mucus production  Prednisone 20 mg/d  Pulmonary rehab would be useful and we will see what he decides  Would like to redo PFT and see where we are  He needs to stop smoking  Eventually want to stopthe continuous doxycyline and porbably transition to po azithromycin  Overall prognosis is poor  Will look into dupixent  RTC 2 month

## 2025-01-17 DIAGNOSIS — J44.1 COPD EXACERBATION: ICD-10-CM

## 2025-01-20 RX ORDER — PREDNISONE 20 MG/1
40 TABLET ORAL
Qty: 60 TABLET | Refills: 2 | Status: SHIPPED | OUTPATIENT
Start: 2025-01-20

## 2025-01-20 RX ORDER — IPRATROPIUM BROMIDE AND ALBUTEROL SULFATE 2.5; .5 MG/3ML; MG/3ML
SOLUTION RESPIRATORY (INHALATION)
Qty: 180 EACH | Refills: 11 | Status: SHIPPED | OUTPATIENT
Start: 2025-01-20

## 2025-01-20 RX ORDER — ROFLUMILAST 250 UG/1
1 TABLET ORAL DAILY
Qty: 30 TABLET | Refills: 2 | Status: SHIPPED | OUTPATIENT
Start: 2025-01-20

## 2025-01-27 ENCOUNTER — TELEPHONE (OUTPATIENT)
Dept: PULMONOLOGY | Facility: CLINIC | Age: 75
End: 2025-01-27
Payer: MEDICARE

## 2025-01-27 DIAGNOSIS — F17.210 CIGARETTE SMOKER: Primary | ICD-10-CM

## 2025-01-27 NOTE — TELEPHONE ENCOUNTER
had ordered a LDCT then he retired . They need us to re order the scan under  name so we can get results

## 2025-02-05 ENCOUNTER — HOSPITAL ENCOUNTER (OUTPATIENT)
Dept: RADIOLOGY | Facility: HOSPITAL | Age: 75
Discharge: HOME | End: 2025-02-05
Attending: INTERNAL MEDICINE
Payer: MEDICARE

## 2025-02-05 DIAGNOSIS — F17.210 CIGARETTE SMOKER: ICD-10-CM

## 2025-02-05 PROCEDURE — 71271 CT THORAX LUNG CANCER SCR C-: CPT | Mod: 26,,, | Performed by: RADIOLOGY

## 2025-02-05 PROCEDURE — 71271 CT THORAX LUNG CANCER SCR C-: CPT | Mod: TC,PO

## 2025-02-11 ENCOUNTER — TELEPHONE (OUTPATIENT)
Dept: PULMONOLOGY | Facility: CLINIC | Age: 75
End: 2025-02-11
Payer: MEDICARE

## 2025-02-11 DIAGNOSIS — J44.1 COPD EXACERBATION: ICD-10-CM

## 2025-02-12 RX ORDER — IPRATROPIUM BROMIDE AND ALBUTEROL SULFATE 2.5; .5 MG/3ML; MG/3ML
3 SOLUTION RESPIRATORY (INHALATION)
Qty: 180 EACH | Refills: 11 | Status: SHIPPED | OUTPATIENT
Start: 2025-02-12

## 2025-02-26 ENCOUNTER — TELEPHONE (OUTPATIENT)
Dept: PULMONOLOGY | Facility: CLINIC | Age: 75
End: 2025-02-26
Payer: MEDICARE

## 2025-02-26 DIAGNOSIS — J44.9 CHRONIC OBSTRUCTIVE PULMONARY DISEASE, UNSPECIFIED COPD TYPE: Primary | ICD-10-CM

## 2025-04-11 RX ORDER — CONTAINER,EMPTY
EACH MISCELLANEOUS
COMMUNITY
Start: 2024-12-27

## 2025-04-11 RX ORDER — ISOPROPYL ALCOHOL 70 ML/100ML
SWAB TOPICAL
COMMUNITY
Start: 2024-12-26

## 2025-04-14 ENCOUNTER — OFFICE VISIT (OUTPATIENT)
Dept: PULMONOLOGY | Facility: CLINIC | Age: 75
End: 2025-04-14
Payer: MEDICARE

## 2025-04-14 VITALS — BODY MASS INDEX: 21.27 KG/M2 | OXYGEN SATURATION: 98 % | WEIGHT: 165.69 LBS | HEART RATE: 111 BPM

## 2025-04-14 DIAGNOSIS — J44.9 CHRONIC OBSTRUCTIVE PULMONARY DISEASE, UNSPECIFIED COPD TYPE: ICD-10-CM

## 2025-04-14 DIAGNOSIS — J96.11 CHRONIC RESPIRATORY FAILURE WITH HYPOXIA: Primary | ICD-10-CM

## 2025-04-14 DIAGNOSIS — G47.33 OSA (OBSTRUCTIVE SLEEP APNEA): ICD-10-CM

## 2025-04-14 DIAGNOSIS — F17.210 CIGARETTE SMOKER: ICD-10-CM

## 2025-04-14 PROCEDURE — 99214 OFFICE O/P EST MOD 30 MIN: CPT | Mod: S$GLB,,, | Performed by: INTERNAL MEDICINE

## 2025-04-14 RX ORDER — IPRATROPIUM BROMIDE 21 UG/1
2 SPRAY, METERED NASAL 2 TIMES DAILY
Qty: 90 ML | Refills: 3 | Status: SHIPPED | OUTPATIENT
Start: 2025-04-14

## 2025-04-14 RX ORDER — AZITHROMYCIN 500 MG/1
TABLET, FILM COATED ORAL
Qty: 15 TABLET | Refills: 11 | Status: SHIPPED | OUTPATIENT
Start: 2025-04-14

## 2025-04-14 NOTE — ASSESSMENT & PLAN NOTE
"Currently smoking about < 1/2 packs per day  40+ pack years  I have counseled pt for 3-5 minutes regarding cigarette cessation.  This has included the need to stop smoking as well as strategies, including but not limited to "cold turkey", CHANTIX (including risks and benefits of the drug), nicotine replacement and WELLBUTRIN.  Last LDSCT chest noted and will need repeat 1/2025    "

## 2025-04-14 NOTE — PROGRESS NOTES
"Office Visit Note *    Patient Name: Tal Vela  MRN: [id number]  : 1950      Reason for visit: COPD, LUIS    HPI:     10/14/2024 - Pt transferring care because Dr Zimmerman retiring.  He has known COPD  ("severe", FEV1 46% in past), recently has had increased SOB, cough with mucus (clear), + nasal congestion, he is on prednisone 20 mg normally but has been taking 40 for the last few days,  TRELEGY 200, using mucinex.  Normally uses O2 2-4 LPM.  He has not been on daliresp.   He is still smoking about 7-8 cigarettes and we discussed this at length.  Last CT chest 2024   which was OK.  His performance ability is not good and we discussed possibly getting involved with pulmonary rehab and he will consider.  He does have some back discomfort at times which may diaphragmatic in nature (reviewed last CXR with hyperinflation and flattened diaphragms). He also has LUIS and is on CPAP and reports compliance and benefit and does use it from time to time during the day.       2024 - Here for follow up visit.  Patient is currently stable on present medications with no recent increases in their symptoms or use of rescue medications.  Since our last visit there have been no hospitalizations or ER visits for their respiratory issues and there does not seem to be anything to suggest unrecognized exacerbations.  I have reviewed the medical regimen and re-educated the pt on the role of rescue and controlling medications.  Inhaler technique and understanding seems adequate.  The patient reports no issues with any of there medications for their COPD.  Refills will be taken care of as needed.  All questions answered.  We have discussed potential future therapies or options as appropriate.  He is on prednisone 20 mg/d.  Here for follow up visit. Patient is being seen for sleep apnea and therapy.  They report no issues with their machine.  They report good compliance with the therapy and feel that they are benefiting " from it.  Discussed alternative therapies/options as appropriate.  Discussed diet, weight and exercise as appropriate.  All questions answered. Have reviewed compliance report if available, if report is not available at the time of the visit we have requested the report from the DME and will review when available.  He is still smoking 5-8 cigarettes per day and we discussed this.   We discussed and he wants to look into dupixent    4/14/2025 - Here for follow up visit.  Patient is currently stable on present medications with no recent increases in their symptoms or use of rescue medications.  Since our last visit there have been no hospitalizations or ER visits for their respiratory issues and there does not seem to be anything to suggest unrecognized exacerbations.  I have reviewed the medical regimen and re-educated the pt on the role of rescue and controlling medications.  Inhaler technique and understanding seems adequate.  The patient reports no issues with any of there medications for their COPD.  Refills will be taken care of as needed.  All questions answered.  We have discussed potential future therapies or options as appropriate.  He is still smoking but is a bit less than before.  He has noted some increased issues with weather changes and pollen.  He has started on dupixent.  He does subjectively think that he is doing better somewhat.  Here for follow up visit. Patient is being seen for sleep apnea and therapy.  They report no issues with their machine.  They report good compliance with the therapy and feel that they are benefiting from it.  Discussed alternative therapies/options as appropriate.  Discussed diet, weight and exercise as appropriate.  All questions answered. Have reviewed compliance report if available, if report is not available at the time of the visit we have requested the report from the DME and will review when available.  He has been taking AFRIN reguarly and we discussed this and  will move toward using ATROVENT NASAL.            Past Medical History    Past Medical History:   Diagnosis Date    BPH (benign prostatic hyperplasia)     Chondrocalcinosis of knee     Degenerative arthritis of left knee     Degenerative arthritis of left wrist     Oxygen dependent     2L    Soft tissue tumor     Tenosynovitis, wrist        Past Surgical History    Past Surgical History:   Procedure Laterality Date    ELBOW SURGERY      pinning (in high school)    TOTAL KNEE ARTHROPLASTY Left 11/10/2014    TOTAL KNEE ARTHROPLASTY Right 2006       Medications      Current Outpatient Medications:     AEROCHAMBER PLUS FLOW-VU, , Disp: , Rfl:     albuterol (PROVENTIL/VENTOLIN HFA) 90 mcg/actuation inhaler, Inhale 2 puffs into the lungs every 4 (four) hours as needed., Disp: , Rfl:     albuterol-ipratropium (DUO-NEB) 2.5 mg-0.5 mg/3 mL nebulizer solution, Take 3 mLs by nebulization every 4 to 6 hours as needed., Disp: 180 each, Rfl: 11    alcohol swabs PadM, Apply topically., Disp: , Rfl:     amLODIPine (NORVASC) 2.5 MG tablet, Take 2.5 mg by mouth every evening., Disp: , Rfl:     aspirin (ECOTRIN) 325 MG EC tablet, Take 1 tablet by mouth every morning., Disp: , Rfl:     benzonatate (TESSALON) 200 MG capsule, Take by mouth., Disp: , Rfl:     cetirizine (ZYRTEC) 10 mg Cap, Take 10 mg by mouth., Disp: , Rfl:     cholecalciferol, vitamin D3, (VITAMIN D3) 25 mcg (1,000 unit) capsule, Take 1,000 Units by mouth once daily., Disp: , Rfl:     cyanocobalamin (VITAMIN B-12) 1000 MCG tablet, Take 100 mcg by mouth once daily., Disp: , Rfl:     doxycycline (VIBRAMYCIN) 100 MG Cap, Take 100 mg by mouth every 12 (twelve) hours., Disp: , Rfl:     dupilumab (DUPIXENT) 300 mg/2 mL Syrg, Inject 2 mLs (300 mg total) into the skin every 14 (fourteen) days., Disp: 1 each, Rfl: 12    finasteride (PROSCAR) 5 mg tablet, Take 1 tablet by mouth nightly., Disp: , Rfl:     fluticasone propionate (FLONASE) 50 mcg/actuation nasal spray, , Disp: , Rfl:      ketorolac (TORADOL) 10 mg tablet, Take 10 mg by mouth every 8 (eight) hours as needed., Disp: , Rfl:     mecobalamin, vitamin B12, 1,000 mcg Chew, Take 1 tablet by mouth once daily., Disp: , Rfl:     nitroGLYCERIN (NITROSTAT) 0.4 MG SL tablet, Place 0.4 mg under the tongue every 5 (five) minutes as needed., Disp: , Rfl:     pantoprazole (PROTONIX) 40 MG tablet, Take 40 mg by mouth once daily., Disp: , Rfl:     predniSONE (DELTASONE) 20 MG tablet, Take 20 mg by mouth., Disp: , Rfl:     predniSONE (DELTASONE) 20 MG tablet, TAKE 2 TABLETS BY MOUTH ONCE DAILY, Disp: 60 tablet, Rfl: 2    roflumilast 250 mcg Tab, TAKE 1 TABLET (250 MCG TOTAL) BY MOUTH ONCE DAILY., Disp: 30 tablet, Rfl: 2    rosuvastatin (CRESTOR) 10 MG tablet, Take 1 tablet by mouth once daily., Disp: , Rfl:     SHARPS CONTAINER, USE TO DISPOSE DUPIXENT, Disp: , Rfl:     tamsulosin (FLOMAX) 0.4 mg Cp24, Take 1 capsule by mouth every evening., Disp: , Rfl:     TRELEGY ELLIPTA 100-62.5-25 mcg DsDv, Inhale 1 puff into the lungs. Pt is on the 200mg, Disp: , Rfl:     venlafaxine (EFFEXOR-XR) 150 MG Cp24, Take 1 capsule by mouth once daily., Disp: , Rfl:     vitamin K2 100 mcg Cap, Take 1 capsule by mouth once daily., Disp: , Rfl:     amoxicillin-clavulanate 875-125mg (AUGMENTIN) 875-125 mg per tablet, Take 1 tablet by mouth 2 (two) times daily. (Patient not taking: Reported on 4/14/2025), Disp: 14 tablet, Rfl: 0    azithromycin (ZITHROMAX) 500 MG tablet, Take 1 po QOD, Disp: 15 tablet, Rfl: 11    ipratropium (ATROVENT) 21 mcg (0.03 %) nasal spray, 2 sprays by Each Nostril route 2 (two) times daily., Disp: 90 mL, Rfl: 3    Allergies    Review of patient's allergies indicates:   Allergen Reactions    Iodine Anaphylaxis     Other reaction(s): Not available    Iodine and iodide containing products Anaphylaxis    Shellfish containing products Anaphylaxis       SocHx    Social History     Tobacco Use   Smoking Status Some Days    Current packs/day: 1.00     Types: Cigarettes   Smokeless Tobacco Never       Social History     Substance and Sexual Activity   Alcohol Use No       Drug Use - no  Occupation - retired, lots of jobs - waste water, AC, body work  Asbestos exposure - no  Pets - na    FMHx    Family History   Problem Relation Name Age of Onset    Lung disease Mother      Lung disease Father           Review of Systems  Review of Systems   Constitutional:  Positive for malaise/fatigue. Negative for chills, diaphoresis, fever and weight loss.        + sleep apnea   HENT:  Negative for congestion.    Eyes:  Negative for pain.   Respiratory:  Positive for cough, sputum production, shortness of breath and wheezing. Negative for hemoptysis and stridor.    Cardiovascular:  Negative for chest pain, palpitations, orthopnea, claudication, leg swelling and PND.   Gastrointestinal:  Negative for abdominal pain, constipation, diarrhea, heartburn, nausea and vomiting.   Genitourinary:  Negative for dysuria, frequency and urgency.   Musculoskeletal:  Negative for falls and myalgias.   Neurological:  Positive for weakness. Negative for sensory change and focal weakness.   Psychiatric/Behavioral:  Negative for depression, substance abuse and suicidal ideas. The patient is not nervous/anxious.        Physical Exam    Vitals:    04/14/25 1258   BP: (P) 122/78   BP Location: Left arm   Patient Position: Sitting   Pulse: (!) 111   SpO2: 98%   Weight: 75.2 kg (165 lb 11.2 oz)       Physical Exam  Vitals and nursing note reviewed.   Constitutional:       General: He is not in acute distress.     Appearance: He is well-developed. He is not ill-appearing, toxic-appearing or diaphoretic.   HENT:      Head: Normocephalic and atraumatic.      Right Ear: External ear normal.      Left Ear: External ear normal.      Nose: Nose normal.      Mouth/Throat:      Mouth: Mucous membranes are moist.      Pharynx: Oropharynx is clear. No oropharyngeal exudate or posterior oropharyngeal erythema.    Eyes:      General: No scleral icterus.        Right eye: No discharge.         Left eye: No discharge.      Extraocular Movements: Extraocular movements intact.      Conjunctiva/sclera: Conjunctivae normal.      Pupils: Pupils are equal, round, and reactive to light.   Neck:      Thyroid: No thyromegaly.      Vascular: No JVD.      Trachea: No tracheal deviation.   Cardiovascular:      Rate and Rhythm: Normal rate and regular rhythm.      Heart sounds: Normal heart sounds. No murmur heard.     No friction rub. No gallop.   Pulmonary:      Effort: Pulmonary effort is normal. No respiratory distress.      Breath sounds: No stridor. Wheezing present. No rhonchi or rales.   Chest:      Chest wall: No tenderness.   Abdominal:      General: Bowel sounds are normal. There is no distension.      Palpations: Abdomen is soft.      Tenderness: There is no abdominal tenderness. There is no guarding.   Musculoskeletal:         General: No tenderness. Normal range of motion.      Cervical back: Normal range of motion and neck supple. No rigidity.      Right lower leg: No edema.      Left lower leg: No edema.   Lymphadenopathy:      Cervical: No cervical adenopathy.   Skin:     General: Skin is warm.      Capillary Refill: Capillary refill takes less than 2 seconds.   Neurological:      General: No focal deficit present.      Mental Status: He is alert and oriented to person, place, and time. Mental status is at baseline.      Cranial Nerves: No cranial nerve deficit.      Motor: No weakness.      Gait: Gait normal.      Deep Tendon Reflexes: Reflexes are normal and symmetric.   Psychiatric:         Mood and Affect: Mood normal.         Behavior: Behavior normal.         Thought Content: Thought content normal.         Judgment: Judgment normal.         Labs    Lab Results   Component Value Date    WBC 9.02 07/11/2024    HGB 13.2 (L) 07/11/2024    HCT 40.4 07/11/2024     07/11/2024       Sodium   Date Value Ref Range  Status   07/11/2024 141 136 - 145 mmol/L Final     Potassium   Date Value Ref Range Status   07/11/2024 3.7 3.5 - 5.1 mmol/L Final     Chloride   Date Value Ref Range Status   07/11/2024 102 95 - 110 mmol/L Final     CO2   Date Value Ref Range Status   07/11/2024 33 (H) 23 - 29 mmol/L Final     Glucose   Date Value Ref Range Status   07/11/2024 86 70 - 110 mg/dL Final     BUN   Date Value Ref Range Status   07/11/2024 23 8 - 23 mg/dL Final     Creatinine   Date Value Ref Range Status   07/11/2024 1.1 0.5 - 1.4 mg/dL Final     Calcium   Date Value Ref Range Status   07/11/2024 9.6 8.7 - 10.5 mg/dL Final     Total Protein   Date Value Ref Range Status   07/11/2024 6.6 6.0 - 8.4 g/dL Final     Albumin   Date Value Ref Range Status   07/11/2024 4.0 3.5 - 5.2 g/dL Final     Total Bilirubin   Date Value Ref Range Status   07/11/2024 0.3 0.1 - 1.0 mg/dL Final     Comment:     For infants and newborns, interpretation of results should be based  on gestational age, weight and in agreement with clinical  observations.    Premature Infant recommended reference ranges:  Up to 24 hours.............<8.0 mg/dL  Up to 48 hours............<12.0 mg/dL  3-5 days..................<15.0 mg/dL  6-29 days.................<15.0 mg/dL       Alkaline Phosphatase   Date Value Ref Range Status   07/11/2024 16 (L) 55 - 135 U/L Final     AST   Date Value Ref Range Status   07/11/2024 11 10 - 40 U/L Final     ALT   Date Value Ref Range Status   07/11/2024 11 10 - 44 U/L Final     Anion Gap   Date Value Ref Range Status   07/11/2024 6 (L) 8 - 16 mmol/L Final       Xrays        Impression/Plan    Problem List Items Addressed This Visit          Pulmonary    COPD (chronic obstructive pulmonary disease)    I suspect he has severe to very severe disease now  Continue TRELEGY and prn COMBIVENT  Continue O2  Added Daliresp to try and help with mucus production  Prednisone 20 mg/d  Pulmonary rehab would be useful and we will see what he decides  Would  "like to redo PFT and see where we are  He needs to stop smoking  po azithromycin QOD  Overall prognosis is poor  Will look into dupixent  RTC 2 month         Chronic respiratory failure with hypoxia - Primary    Continue with O2            Other    Cigarette smoker    Currently smoking about < 1/2 packs per day  40+ pack years  I have counseled pt for 3-5 minutes regarding cigarette cessation.  This has included the need to stop smoking as well as strategies, including but not limited to "cold turkey", CHANTIX (including risks and benefits of the drug), nicotine replacement and WELLBUTRIN.  Last LDSCT chest noted and will need repeat 1/2025           LUIS (obstructive sleep apnea)    Continue present PAP therapy  Pt to call if they have any trouble with their machines  Discussed with pt about signs and symptoms to watch for  Will refill supplies as needed  May have consider transition to BiPAP or NIPPV due to his severe lung disease                        Breezy Gastelum MD        "

## 2025-04-14 NOTE — ASSESSMENT & PLAN NOTE
I suspect he has severe to very severe disease now  Continue TRELEGY and prn COMBIVENT  Continue O2  Added Daliresp to try and help with mucus production  Prednisone 20 mg/d  Pulmonary rehab would be useful and we will see what he decides  Would like to redo PFT and see where we are  He needs to stop smoking  po azithromycin QOD  Overall prognosis is poor  Will look into dupixent  RTC 2 month

## 2025-05-01 ENCOUNTER — HOSPITAL ENCOUNTER (EMERGENCY)
Facility: HOSPITAL | Age: 75
Discharge: HOME | End: 2025-05-01
Attending: EMERGENCY MEDICINE
Payer: MEDICARE

## 2025-05-01 VITALS
HEIGHT: 74 IN | BODY MASS INDEX: 21.56 KG/M2 | TEMPERATURE: 98 F | HEART RATE: 100 BPM | WEIGHT: 168 LBS | SYSTOLIC BLOOD PRESSURE: 131 MMHG | DIASTOLIC BLOOD PRESSURE: 70 MMHG | OXYGEN SATURATION: 95 % | RESPIRATION RATE: 22 BRPM

## 2025-05-01 DIAGNOSIS — R06.02 SHORTNESS OF BREATH: ICD-10-CM

## 2025-05-01 DIAGNOSIS — J44.1 COPD EXACERBATION: Primary | ICD-10-CM

## 2025-05-01 LAB
ABSOLUTE EOSINOPHIL (SMH): 0.05 K/UL
ABSOLUTE MONOCYTE (SMH): 0.72 K/UL (ref 0.3–1)
ABSOLUTE NEUTROPHIL COUNT (SMH): 5.2 K/UL (ref 1.8–7.7)
ALBUMIN SERPL-MCNC: 3.6 G/DL (ref 3.5–5.2)
ALP SERPL-CCNC: 15 UNIT/L (ref 55–135)
ALT SERPL-CCNC: 15 UNIT/L (ref 10–44)
ANION GAP (SMH): 7 MMOL/L (ref 8–16)
AST SERPL-CCNC: 11 UNIT/L (ref 10–40)
BASOPHILS # BLD AUTO: 0.03 K/UL
BASOPHILS NFR BLD AUTO: 0.4 %
BILIRUB SERPL-MCNC: 0.3 MG/DL (ref 0.1–1)
BNP SERPL-MCNC: 96 PG/ML
BUN SERPL-MCNC: 20 MG/DL (ref 8–23)
CALCIUM SERPL-MCNC: 9.7 MG/DL (ref 8.7–10.5)
CHLORIDE SERPL-SCNC: 100 MMOL/L (ref 95–110)
CO2 SERPL-SCNC: 30 MMOL/L (ref 23–29)
CREAT SERPL-MCNC: 1.2 MG/DL (ref 0.5–1.4)
ERYTHROCYTE [DISTWIDTH] IN BLOOD BY AUTOMATED COUNT: 14.6 % (ref 11.5–14.5)
GFR SERPLBLD CREATININE-BSD FMLA CKD-EPI: >60 ML/MIN/1.73/M2
GLUCOSE SERPL-MCNC: 69 MG/DL (ref 70–110)
HCT VFR BLD AUTO: 37.2 % (ref 40–54)
HCV AB SERPL QL IA: NORMAL
HGB BLD-MCNC: 12 GM/DL (ref 14–18)
HIV 1+2 AB+HIV1 P24 AG SERPL QL IA: NORMAL
IMM GRANULOCYTES # BLD AUTO: 0.06 K/UL (ref 0–0.04)
IMM GRANULOCYTES NFR BLD AUTO: 0.8 % (ref 0–0.5)
LYMPHOCYTES # BLD AUTO: 1.5 K/UL (ref 1–4.8)
MAGNESIUM SERPL-MCNC: 1.8 MG/DL (ref 1.6–2.6)
MCH RBC QN AUTO: 29.3 PG (ref 27–31)
MCHC RBC AUTO-ENTMCNC: 32.3 G/DL (ref 32–36)
MCV RBC AUTO: 91 FL (ref 82–98)
NUCLEATED RBC (/100WBC) (SMH): 0 /100 WBC
OHS QRS DURATION: 94 MS
OHS QTC CALCULATION: 479 MS
PLATELET # BLD AUTO: 236 K/UL (ref 150–450)
PMV BLD AUTO: 9.1 FL (ref 9.2–12.9)
POCT GLUCOSE: 123 MG/DL (ref 70–110)
POTASSIUM SERPL-SCNC: 3.6 MMOL/L (ref 3.5–5.1)
PROT SERPL-MCNC: 5.8 GM/DL (ref 6–8.4)
RBC # BLD AUTO: 4.09 M/UL (ref 4.6–6.2)
RELATIVE EOSINOPHIL (SMH): 0.7 % (ref 0–8)
RELATIVE LYMPHOCYTE (SMH): 20 % (ref 18–48)
RELATIVE MONOCYTE (SMH): 9.6 % (ref 4–15)
RELATIVE NEUTROPHIL (SMH): 68.5 % (ref 38–73)
SARS-COV-2 RDRP RESP QL NAA+PROBE: NEGATIVE
SODIUM SERPL-SCNC: 137 MMOL/L (ref 136–145)
TROPONIN HIGH SENSITIVE (SMH): 16.3 PG/ML
TROPONIN HIGH SENSITIVE (SMH): 17.2 PG/ML
WBC # BLD AUTO: 7.51 K/UL (ref 3.9–12.7)

## 2025-05-01 PROCEDURE — 82962 GLUCOSE BLOOD TEST: CPT

## 2025-05-01 PROCEDURE — 94761 N-INVAS EAR/PLS OXIMETRY MLT: CPT

## 2025-05-01 PROCEDURE — 93005 ELECTROCARDIOGRAM TRACING: CPT | Performed by: GENERAL PRACTICE

## 2025-05-01 PROCEDURE — 25000242 PHARM REV CODE 250 ALT 637 W/ HCPCS: Performed by: EMERGENCY MEDICINE

## 2025-05-01 PROCEDURE — 99285 EMERGENCY DEPT VISIT HI MDM: CPT | Mod: 25

## 2025-05-01 PROCEDURE — 36415 COLL VENOUS BLD VENIPUNCTURE: CPT | Performed by: EMERGENCY MEDICINE

## 2025-05-01 PROCEDURE — 83735 ASSAY OF MAGNESIUM: CPT | Performed by: EMERGENCY MEDICINE

## 2025-05-01 PROCEDURE — 83880 ASSAY OF NATRIURETIC PEPTIDE: CPT

## 2025-05-01 PROCEDURE — 84484 ASSAY OF TROPONIN QUANT: CPT | Performed by: EMERGENCY MEDICINE

## 2025-05-01 PROCEDURE — 87389 HIV-1 AG W/HIV-1&-2 AB AG IA: CPT | Performed by: EMERGENCY MEDICINE

## 2025-05-01 PROCEDURE — 27000221 HC OXYGEN, UP TO 24 HOURS

## 2025-05-01 PROCEDURE — 82040 ASSAY OF SERUM ALBUMIN: CPT

## 2025-05-01 PROCEDURE — 94640 AIRWAY INHALATION TREATMENT: CPT

## 2025-05-01 PROCEDURE — 93010 ELECTROCARDIOGRAM REPORT: CPT | Mod: ,,, | Performed by: GENERAL PRACTICE

## 2025-05-01 PROCEDURE — 86803 HEPATITIS C AB TEST: CPT | Performed by: EMERGENCY MEDICINE

## 2025-05-01 PROCEDURE — U0002 COVID-19 LAB TEST NON-CDC: HCPCS

## 2025-05-01 PROCEDURE — 85025 COMPLETE CBC W/AUTO DIFF WBC: CPT

## 2025-05-01 RX ORDER — IPRATROPIUM BROMIDE AND ALBUTEROL SULFATE 2.5; .5 MG/3ML; MG/3ML
3 SOLUTION RESPIRATORY (INHALATION)
Status: COMPLETED | OUTPATIENT
Start: 2025-05-01 | End: 2025-05-01

## 2025-05-01 RX ADMIN — IPRATROPIUM BROMIDE AND ALBUTEROL SULFATE 3 ML: 2.5; .5 SOLUTION RESPIRATORY (INHALATION) at 10:05

## 2025-05-01 NOTE — ED PROVIDER NOTES
Encounter Date: 5/1/2025       History     Chief Complaint   Patient presents with    Shortness of Breath     Shortness of breath, bilateral ankle swelling, generalized weakness, and dizziness for a while.      74-year-old male with a past medical history of COPD and BPH presents with lower extremity edema.  He reports that he has had some lower extremity swelling and dizziness for a few days.  He reports it has been persistent.  He reports some shortness of breath that has been going on for a long period of time, that he contributes to his chronic COPD.  He reports his shortness of breath is improved with his home COPD medications.  He denies any associated chest pain, abdominal pain, cough, congestion, fever/chills, abdominal pain, or nausea/vomiting.  He is on chronic home O2 as well.  There are no aggravating factors.      Review of patient's allergies indicates:   Allergen Reactions    Iodine Anaphylaxis     Other reaction(s): Not available    Iodine and iodide containing products Anaphylaxis    Shellfish containing products Anaphylaxis     Past Medical History:   Diagnosis Date    BPH (benign prostatic hyperplasia)     Chondrocalcinosis of knee     Degenerative arthritis of left knee     Degenerative arthritis of left wrist     Oxygen dependent     2L    Soft tissue tumor     Tenosynovitis, wrist      Past Surgical History:   Procedure Laterality Date    ELBOW SURGERY      pinning (in high school)    TOTAL KNEE ARTHROPLASTY Left 11/10/2014    TOTAL KNEE ARTHROPLASTY Right 2006     Family History   Problem Relation Name Age of Onset    Lung disease Mother      Lung disease Father       Social History[1]  Review of Systems   Constitutional:  Negative for chills, diaphoresis, fatigue and fever.   HENT:  Negative for congestion and rhinorrhea.    Respiratory:  Positive for shortness of breath. Negative for cough.    Cardiovascular:  Positive for leg swelling. Negative for chest pain.   Gastrointestinal:  Negative  for abdominal pain, diarrhea, nausea and vomiting.   Genitourinary:  Negative for dysuria, frequency and testicular pain.   Musculoskeletal:  Negative for gait problem.   Skin:  Negative for color change.   Neurological:  Negative for dizziness and numbness.   Psychiatric/Behavioral:  Negative for agitation and confusion.        Physical Exam     Initial Vitals   BP Pulse Resp Temp SpO2   05/01/25 0938 05/01/25 0936 05/01/25 0936 05/01/25 0936 05/01/25 0936   131/86 80 16 98.5 °F (36.9 °C) 95 %      MAP       --                Physical Exam    Nursing note and vitals reviewed.  Constitutional: He appears well-developed and well-nourished.   HENT:   Head: Normocephalic and atraumatic.   Eyes: EOM are normal. Pupils are equal, round, and reactive to light.   Neck: Neck supple.   Cardiovascular:  Normal rate and regular rhythm.           Pulmonary/Chest:   Diminished lung sounds noted with expiratory wheezing noted.   Abdominal: Abdomen is soft. Bowel sounds are normal. He exhibits no distension. There is no abdominal tenderness. There is no rebound and no guarding.   Musculoskeletal:         General: Edema present. Normal range of motion.      Cervical back: Neck supple.      Comments: Mild edema noted to bilateral lower extremities.     Neurological: He is alert and oriented to person, place, and time.   Skin: Skin is warm and dry.   Psychiatric: He has a normal mood and affect.         ED Course   Procedures  Labs Reviewed   COMPREHENSIVE METABOLIC PANEL - Abnormal       Result Value    Sodium 137      Potassium 3.6      Chloride 100      CO2 30 (*)     Glucose 69 (*)     BUN 20      Creatinine 1.2      Calcium 9.7      Protein Total 5.8 (*)     Albumin 3.6      Bilirubin Total 0.3      ALP 15 (*)     AST 11      ALT 15      Anion Gap 7 (*)     eGFR >60     CBC WITH DIFFERENTIAL - Abnormal    WBC 7.51      RBC 4.09 (*)     Hgb 12.0 (*)     Hct 37.2 (*)     MCV 91      MCH 29.3      MCHC 32.3      RDW 14.6 (*)      Platelet Count 236      MPV 9.1 (*)     Nucleated RBC 0      Neut % 68.5      Lymph % 20.0      Mono % 9.6      Eos % 0.7      Basophil % 0.4      Imm Grans % 0.8 (*)     Neut # 5.2      Lymph # 1.50      Mono # 0.72      Eos # 0.05      Baso # 0.03      Imm Grans # 0.06 (*)    TROPONIN I HIGH SENSITIVITY - Abnormal    Troponin High Sensitive 17.2 (*)    TROPONIN I HIGH SENSITIVITY - Abnormal    Troponin High Sensitive 16.3 (*)    POCT GLUCOSE - Abnormal    POCT Glucose 123 (*)    B-TYPE NATRIURETIC PEPTIDE - Normal    BNP 96     SARS-COV-2 RNA AMPLIFICATION, QUAL - Normal    SARS COV-2 Molecular Negative     MAGNESIUM - Normal    Magnesium 1.8     CBC W/ AUTO DIFFERENTIAL    Narrative:     The following orders were created for panel order CBC Auto Differential.  Procedure                               Abnormality         Status                     ---------                               -----------         ------                     CBC with Differential[[id number]]       Abnormal            Final result                 Please view results for these tests on the individual orders.   HEPATITIS C ANTIBODY   HIV 1 / 2 ANTIBODY   EXTRA TUBES    Narrative:     The following orders were created for panel order EXTRA TUBES.  Procedure                               Abnormality         Status                     ---------                               -----------         ------                     Light Blue Top Hold[[id number]]                             In process                 Gold Top Hold[[id number]]                                   In process                   Please view results for these tests on the individual orders.   LIGHT BLUE TOP HOLD   GOLD TOP HOLD   POCT GLUCOSE MONITORING CONTINUOUS     EKG Readings: (Independently Interpreted)   Initial Reading: No STEMI. Rhythm: Normal Sinus Rhythm. Heart Rate: 99. Ectopy: PVCs. Conduction: Normal. ST Segments: Normal ST Segments. T Waves: Normal. Clinical  Impression: Normal Sinus Rhythm with PVCs     ECG Results              EKG 12-lead (In process)        Collection Time Result Time QRS Duration OHS QTC Calculation    05/01/25 09:55:21 05/01/25 10:16:16 94 479                     In process by Interface, Lab In [facility] (05/01/25 10:16:26)                   Narrative:    Test Reason : R06.02,    Vent. Rate :  99 BPM     Atrial Rate :  99 BPM     P-R Int : 116 ms          QRS Dur :  94 ms      QT Int : 374 ms       P-R-T Axes :  80  74  72 degrees    QTcB Int : 479 ms    Sinus rhythm with frequent and consecutive Premature ventricular complexes  and Premature atrial complexes with junctional escape complexes  Possible Left atrial enlargement  Septal infarct ,age undetermined  Abnormal ECG  When compared with ECG of 11-Jul-2024 21:03,  Premature ventricular complexes are now Present  Premature atrial complexes are now Present  Sinus rhythm is now with junctional escape complexes  Septal infarct is now Present  ST no longer depressed in Anterior leads    Referred By: AAAREFERRAL SELF           Confirmed By:                                   Imaging Results              X-Ray Chest 1 View (Final result)  Result time 05/01/25 10:20:00      Final result by Toi May DO (05/01/25 10:20:00)                   Impression:      No acute cardiopulmonary abnormality.      Electronically signed by: Toi May  Date:    05/01/2025  Time:    10:20               Narrative:    EXAMINATION:  XR CHEST 1 VIEW    CLINICAL HISTORY:  shortness of breath;    FINDINGS:  Portable chest with comparison chest x-ray 07/11/2024.  Normal cardiomediastinal silhouette.Lungs are clear. Pulmonary vasculature is normal. No acute osseous abnormality.                                       Medications   albuterol-ipratropium 2.5 mg-0.5 mg/3 mL nebulizer solution 3 mL (3 mLs Nebulization Given 5/1/25 1045)     Medical Decision Making  74-year-old male presented with multiple  complaints.    Initial differential diagnosis included but not limited to COPD exacerbation, atypical MI, and heart failure.    Amount and/or Complexity of Data Reviewed  Labs: ordered.  Radiology: ordered.  ECG/medicine tests: ordered.    Risk  Prescription drug management.  Risk Details: The patient was emergently evaluated in the emergency department, his evaluation was significant for an elderly male with abnormal lung sounds.  The patient's EKG showed no acute abnormalities per my independent interpretation.  The patient's chest x-ray showed no acute abnormalities per Radiology.  The patient's labs were significant initially for mild hypoglycemia and multiple serial mildly elevated troponins.  The patient's hyperglycemia as resolved here in the emergency department.  The patient was treated with a respiratory nebulizer treatment, with improvement in his breathing.  The etiology of his symptoms could be a mild exacerbation of his chronic COPD and dependent edema.  The patient is stable for discharge to home and does not require further care or workup at this time.  He is referred to primary care and his pulmonologist as an outpatient.  He is to continue his home medications as previously prescribed.                                      Clinical Impression:  Final diagnoses:  [R06.02] Shortness of breath  [J44.1] COPD exacerbation (Primary)          ED Disposition Condition    Discharge Stable          ED Prescriptions    None       Follow-up Information       Follow up With Specialties Details Why Contact Info    Breezy Gastelum MD Pulmonary Disease Schedule an appointment as soon as possible for a visit   [address] [facility]  [address]  [place] [address]  [phone]      Dara Kemp MD Hospitalist, Internal Medicine Schedule an appointment as soon as possible for a visit   [address]  [address]  [place] [address]  [phone]                   [1]   Social History  Tobacco Use    Smoking  status: Some Days     Current packs/day: 1.00     Types: Cigarettes    Smokeless tobacco: Never   Substance Use Topics    Alcohol use: No    Drug use: No        Favian Armstrong MD  05/01/25 8685

## 2025-07-02 ENCOUNTER — OFFICE VISIT (OUTPATIENT)
Dept: PULMONOLOGY | Facility: CLINIC | Age: 75
End: 2025-07-02
Payer: MEDICARE

## 2025-07-02 VITALS
DIASTOLIC BLOOD PRESSURE: 78 MMHG | WEIGHT: 168 LBS | HEART RATE: 86 BPM | SYSTOLIC BLOOD PRESSURE: 130 MMHG | BODY MASS INDEX: 21.57 KG/M2 | OXYGEN SATURATION: 93 %

## 2025-07-02 DIAGNOSIS — J96.11 CHRONIC RESPIRATORY FAILURE WITH HYPOXIA: ICD-10-CM

## 2025-07-02 DIAGNOSIS — G47.33 OSA (OBSTRUCTIVE SLEEP APNEA): ICD-10-CM

## 2025-07-02 DIAGNOSIS — J44.1 COPD EXACERBATION: ICD-10-CM

## 2025-07-02 DIAGNOSIS — F17.210 CIGARETTE SMOKER: ICD-10-CM

## 2025-07-02 DIAGNOSIS — J44.9 CHRONIC OBSTRUCTIVE PULMONARY DISEASE, UNSPECIFIED COPD TYPE: Primary | ICD-10-CM

## 2025-07-02 PROCEDURE — 99214 OFFICE O/P EST MOD 30 MIN: CPT | Mod: S$GLB,,, | Performed by: INTERNAL MEDICINE

## 2025-07-02 RX ORDER — FLUTICASONE PROPIONATE 50 MCG
2 SPRAY, SUSPENSION (ML) NASAL DAILY
Qty: 18.2 ML | Refills: 11 | Status: SHIPPED | OUTPATIENT
Start: 2025-07-02

## 2025-07-02 RX ORDER — PREDNISONE 20 MG/1
20 TABLET ORAL DAILY
Qty: 30 TABLET | Refills: 5 | Status: SHIPPED | OUTPATIENT
Start: 2025-07-02

## 2025-07-02 RX ORDER — DOXYCYCLINE HYCLATE 100 MG
100 TABLET ORAL
COMMUNITY
Start: 2025-06-23

## 2025-07-02 RX ORDER — IPRATROPIUM BROMIDE AND ALBUTEROL SULFATE 2.5; .5 MG/3ML; MG/3ML
3 SOLUTION RESPIRATORY (INHALATION)
Qty: 180 EACH | Refills: 11 | Status: SHIPPED | OUTPATIENT
Start: 2025-07-02

## 2025-07-02 NOTE — PROGRESS NOTES
"Office Visit Note *    Patient Name: Tal Vela  MRN: [id number]  : 1950      Reason for visit: COPD, LUIS    HPI:     10/14/2024 - Pt transferring care because Dr Zimmerman retiring.  He has known COPD  ("severe", FEV1 46% in past), recently has had increased SOB, cough with mucus (clear), + nasal congestion, he is on prednisone 20 mg normally but has been taking 40 for the last few days,  TRELEGY 200, using mucinex.  Normally uses O2 2-4 LPM.  He has not been on daliresp.   He is still smoking about 7-8 cigarettes and we discussed this at length.  Last CT chest 2024   which was OK.  His performance ability is not good and we discussed possibly getting involved with pulmonary rehab and he will consider.  He does have some back discomfort at times which may diaphragmatic in nature (reviewed last CXR with hyperinflation and flattened diaphragms). He also has LUIS and is on CPAP and reports compliance and benefit and does use it from time to time during the day.       2024 - Here for follow up visit.  Patient is currently stable on present medications with no recent increases in their symptoms or use of rescue medications.  Since our last visit there have been no hospitalizations or ER visits for their respiratory issues and there does not seem to be anything to suggest unrecognized exacerbations.  I have reviewed the medical regimen and re-educated the pt on the role of rescue and controlling medications.  Inhaler technique and understanding seems adequate.  The patient reports no issues with any of there medications for their COPD.  Refills will be taken care of as needed.  All questions answered.  We have discussed potential future therapies or options as appropriate.  He is on prednisone 20 mg/d.  Here for follow up visit. Patient is being seen for sleep apnea and therapy.  They report no issues with their machine.  They report good compliance with the therapy and feel that they are benefiting " from it.  Discussed alternative therapies/options as appropriate.  Discussed diet, weight and exercise as appropriate.  All questions answered. Have reviewed compliance report if available, if report is not available at the time of the visit we have requested the report from the DME and will review when available.  He is still smoking 5-8 cigarettes per day and we discussed this.   We discussed and he wants to look into dupixent    4/14/2025 - Here for follow up visit.  Patient is currently stable on present medications with no recent increases in their symptoms or use of rescue medications.  Since our last visit there have been no hospitalizations or ER visits for their respiratory issues and there does not seem to be anything to suggest unrecognized exacerbations.  I have reviewed the medical regimen and re-educated the pt on the role of rescue and controlling medications.  Inhaler technique and understanding seems adequate.  The patient reports no issues with any of there medications for their COPD.  Refills will be taken care of as needed.  All questions answered.  We have discussed potential future therapies or options as appropriate.  He is still smoking but is a bit less than before.  He has noted some increased issues with weather changes and pollen.  He has started on dupixent.  He does subjectively think that he is doing better somewhat.  Here for follow up visit. Patient is being seen for sleep apnea and therapy.  They report no issues with their machine.  They report good compliance with the therapy and feel that they are benefiting from it.  Discussed alternative therapies/options as appropriate.  Discussed diet, weight and exercise as appropriate.  All questions answered. Have reviewed compliance report if available, if report is not available at the time of the visit we have requested the report from the DME and will review when available.  He has been taking AFRIN reguarly and we discussed this and  will move toward using ATROVENT NASAL.     7/2/2025 - Here for follow up visit.  Patient is currently stable on present medications with no recent increases in their symptoms or use of rescue medications.  Since our last visit there have been no hospitalizations or ER visits for their respiratory issues and there does not seem to be anything to suggest unrecognized exacerbations.  I have reviewed the medical regimen and re-educated the pt on the role of rescue and controlling medications.  Inhaler technique and understanding seems adequate.  The patient reports no issues with any of there medications for their COPD.  Refills will be taken care of as needed.  All questions answered.  We have discussed potential future therapies or options as appropriate.  He would like to be checked to see if he needs a high flow concentrator. Here for follow up visit. Patient is being seen for sleep apnea and therapy.  They report no issues with their machine.  They report good compliance with the therapy and feel that they are benefiting from it.  Discussed alternative therapies/options as appropriate.  Discussed diet, weight and exercise as appropriate.  All questions answered. Have reviewed compliance report if available, if report is not available at the time of the visit we have requested the report from the DME and will review when available.  His compliance is excellent by report.  He has not been using AFRIN.  Also feels he is better with daliresp.          Past Medical History    Past Medical History:   Diagnosis Date    BPH (benign prostatic hyperplasia)     Chondrocalcinosis of knee     Degenerative arthritis of left knee     Degenerative arthritis of left wrist     Oxygen dependent     2L    Soft tissue tumor     Tenosynovitis, wrist        Past Surgical History    Past Surgical History:   Procedure Laterality Date    ELBOW SURGERY      pinning (in high school)    TOTAL KNEE ARTHROPLASTY Left 11/10/2014    TOTAL KNEE  ARTHROPLASTY Right 2006       Medications      Current Outpatient Medications:     AEROCHAMBER PLUS FLOW-VU, , Disp: , Rfl:     albuterol (PROVENTIL/VENTOLIN HFA) 90 mcg/actuation inhaler, Inhale 2 puffs into the lungs every 4 (four) hours as needed., Disp: , Rfl:     alcohol swabs PadM, Apply topically., Disp: , Rfl:     amLODIPine (NORVASC) 2.5 MG tablet, Take 2.5 mg by mouth every evening., Disp: , Rfl:     aspirin (ECOTRIN) 325 MG EC tablet, Take 1 tablet by mouth every morning., Disp: , Rfl:     benzonatate (TESSALON) 200 MG capsule, Take by mouth., Disp: , Rfl:     cetirizine (ZYRTEC) 10 mg Cap, Take 10 mg by mouth., Disp: , Rfl:     cholecalciferol, vitamin D3, (VITAMIN D3) 25 mcg (1,000 unit) capsule, Take 1,000 Units by mouth once daily., Disp: , Rfl:     cyanocobalamin (VITAMIN B-12) 1000 MCG tablet, Take 100 mcg by mouth once daily., Disp: , Rfl:     doxycycline (VIBRA-TABS) 100 MG tablet, Take 100 mg by mouth., Disp: , Rfl:     dupilumab (DUPIXENT) 300 mg/2 mL Syrg, Inject 2 mLs (300 mg total) into the skin every 14 (fourteen) days., Disp: 1 each, Rfl: 12    finasteride (PROSCAR) 5 mg tablet, Take 1 tablet by mouth nightly., Disp: , Rfl:     ipratropium (ATROVENT) 21 mcg (0.03 %) nasal spray, 2 sprays by Each Nostril route 2 (two) times daily., Disp: 90 mL, Rfl: 3    ketorolac (TORADOL) 10 mg tablet, Take 10 mg by mouth every 8 (eight) hours as needed., Disp: , Rfl:     mecobalamin, vitamin B12, 1,000 mcg Chew, Take 1 tablet by mouth once daily., Disp: , Rfl:     nitroGLYCERIN (NITROSTAT) 0.4 MG SL tablet, Place 0.4 mg under the tongue every 5 (five) minutes as needed., Disp: , Rfl:     pantoprazole (PROTONIX) 40 MG tablet, Take 40 mg by mouth once daily., Disp: , Rfl:     roflumilast 250 mcg Tab, TAKE 1 TABLET (250 MCG TOTAL) BY MOUTH ONCE DAILY., Disp: 30 tablet, Rfl: 2    rosuvastatin (CRESTOR) 10 MG tablet, Take 1 tablet by mouth once daily., Disp: , Rfl:     SHARPS CONTAINER, USE TO DISPOSE  DUPIXENT, Disp: , Rfl:     tamsulosin (FLOMAX) 0.4 mg Cp24, Take 1 capsule by mouth every evening., Disp: , Rfl:     TRELEGY ELLIPTA 100-62.5-25 mcg DsDv, Inhale 1 puff into the lungs. Pt is on the 200mg, Disp: , Rfl:     venlafaxine (EFFEXOR-XR) 150 MG Cp24, Take 1 capsule by mouth once daily., Disp: , Rfl:     vitamin K2 100 mcg Cap, Take 1 capsule by mouth once daily., Disp: , Rfl:     albuterol-ipratropium (DUO-NEB) 2.5 mg-0.5 mg/3 mL nebulizer solution, Take 3 mLs by nebulization every 4 to 6 hours as needed., Disp: 180 each, Rfl: 11    azithromycin (ZITHROMAX) 500 MG tablet, Take 1 po QOD (Patient not taking: Reported on 7/2/2025), Disp: 15 tablet, Rfl: 11    fluticasone propionate (FLONASE) 50 mcg/actuation nasal spray, 2 sprays (100 mcg total) by Each Nostril route once daily., Disp: 18.2 mL, Rfl: 11    predniSONE (DELTASONE) 20 MG tablet, Take 20 mg by mouth. (Patient not taking: Reported on 7/2/2025), Disp: , Rfl:     predniSONE (DELTASONE) 20 MG tablet, Take 1 tablet (20 mg total) by mouth once daily., Disp: 30 tablet, Rfl: 5    Allergies    Review of patient's allergies indicates:   Allergen Reactions    Iodine Anaphylaxis     Other reaction(s): Not available    Iodine and iodide containing products Anaphylaxis    Shellfish containing products Anaphylaxis       SocHx    Social History     Tobacco Use   Smoking Status Some Days    Current packs/day: 1.00    Types: Cigarettes   Smokeless Tobacco Never       Social History     Substance and Sexual Activity   Alcohol Use No       Drug Use - no  Occupation - retired, lots of jobs - waste water, AC, body work  Asbestos exposure - no  Pets - na    FMHx    Family History   Problem Relation Name Age of Onset    Lung disease Mother      Lung disease Father           Review of Systems  Review of Systems   Constitutional:  Positive for malaise/fatigue. Negative for chills, diaphoresis, fever and weight loss.        + sleep apnea   HENT:  Negative for congestion.     Eyes:  Negative for pain.   Respiratory:  Positive for cough, sputum production, shortness of breath and wheezing. Negative for hemoptysis and stridor.    Cardiovascular:  Negative for chest pain, palpitations, orthopnea, claudication, leg swelling and PND.   Gastrointestinal:  Negative for abdominal pain, constipation, diarrhea, heartburn, nausea and vomiting.   Genitourinary:  Negative for dysuria, frequency and urgency.   Musculoskeletal:  Negative for falls and myalgias.   Neurological:  Positive for weakness. Negative for sensory change and focal weakness.   Psychiatric/Behavioral:  Negative for depression, substance abuse and suicidal ideas. The patient is not nervous/anxious.        Physical Exam    Vitals:    07/02/25 1138   BP: 130/78   BP Location: Left arm   Patient Position: Sitting   Pulse: 86   SpO2: (!) 93%   Weight: 76.2 kg (168 lb)       Physical Exam  Vitals and nursing note reviewed.   Constitutional:       General: He is not in acute distress.     Appearance: He is well-developed. He is not ill-appearing, toxic-appearing or diaphoretic.   HENT:      Head: Normocephalic and atraumatic.      Right Ear: External ear normal.      Left Ear: External ear normal.      Nose: Nose normal.      Mouth/Throat:      Mouth: Mucous membranes are moist.      Pharynx: Oropharynx is clear. No oropharyngeal exudate or posterior oropharyngeal erythema.   Eyes:      General: No scleral icterus.        Right eye: No discharge.         Left eye: No discharge.      Extraocular Movements: Extraocular movements intact.      Conjunctiva/sclera: Conjunctivae normal.      Pupils: Pupils are equal, round, and reactive to light.   Neck:      Thyroid: No thyromegaly.      Vascular: No JVD.      Trachea: No tracheal deviation.   Cardiovascular:      Rate and Rhythm: Normal rate and regular rhythm.      Heart sounds: Normal heart sounds. No murmur heard.     No friction rub. No gallop.   Pulmonary:      Effort: Pulmonary effort  is normal. No respiratory distress.      Breath sounds: No stridor. Wheezing present. No rhonchi or rales.   Chest:      Chest wall: No tenderness.   Abdominal:      General: Bowel sounds are normal. There is no distension.      Palpations: Abdomen is soft.      Tenderness: There is no abdominal tenderness. There is no guarding.   Musculoskeletal:         General: No tenderness. Normal range of motion.      Cervical back: Normal range of motion and neck supple. No rigidity.      Right lower leg: No edema.      Left lower leg: No edema.   Lymphadenopathy:      Cervical: No cervical adenopathy.   Skin:     General: Skin is warm.      Capillary Refill: Capillary refill takes less than 2 seconds.   Neurological:      General: No focal deficit present.      Mental Status: He is alert and oriented to person, place, and time. Mental status is at baseline.      Cranial Nerves: No cranial nerve deficit.      Motor: No weakness.      Gait: Gait normal.      Deep Tendon Reflexes: Reflexes are normal and symmetric.   Psychiatric:         Mood and Affect: Mood normal.         Behavior: Behavior normal.         Thought Content: Thought content normal.         Judgment: Judgment normal.         Labs    Lab Results   Component Value Date    WBC 7.51 05/01/2025    HGB 12.0 (L) 05/01/2025    HCT 37.2 (L) 05/01/2025     05/01/2025       Sodium   Date Value Ref Range Status   05/01/2025 137 136 - 145 mmol/L Final     Potassium   Date Value Ref Range Status   05/01/2025 3.6 3.5 - 5.1 mmol/L Final     Chloride   Date Value Ref Range Status   05/01/2025 100 95 - 110 mmol/L Final     CO2   Date Value Ref Range Status   05/01/2025 30 (H) 23 - 29 mmol/L Final     Glucose   Date Value Ref Range Status   05/01/2025 69 (L) 70 - 110 mg/dL Final     BUN   Date Value Ref Range Status   05/01/2025 20 8 - 23 mg/dL Final     Creatinine   Date Value Ref Range Status   05/01/2025 1.2 0.5 - 1.4 mg/dL Final     Calcium   Date Value Ref Range  Status   05/01/2025 9.7 8.7 - 10.5 mg/dL Final     Protein Total   Date Value Ref Range Status   05/01/2025 5.8 (L) 6.0 - 8.4 gm/dL Final     Albumin   Date Value Ref Range Status   05/01/2025 3.6 3.5 - 5.2 g/dL Final     Bilirubin Total   Date Value Ref Range Status   05/01/2025 0.3 0.1 - 1.0 mg/dL Final     Comment:     For infants and newborns, interpretation of results should be based   on gestational age, weight and in agreement with clinical   observations.    Premature Infant recommended reference ranges:   0-24 hours:  <8.0 mg/dL   24-48 hours: <12.0 mg/dL   3-5 days:    <15.0 mg/dL   6-29 days:   <15.0 mg/dL     ALP   Date Value Ref Range Status   05/01/2025 15 (L) 55 - 135 unit/L Final     AST   Date Value Ref Range Status   05/01/2025 11 10 - 40 unit/L Final     ALT   Date Value Ref Range Status   05/01/2025 15 10 - 44 unit/L Final     Anion Gap   Date Value Ref Range Status   05/01/2025 7 (L) 8 - 16 mmol/L Final       Xrays        Impression/Plan    Problem List Items Addressed This Visit          Pulmonary    COPD (chronic obstructive pulmonary disease) - Primary    Continue present medications.  Will refill medications as needed.  Instructed patient to contact us with any issues concerning their medications (cost, reactions, etc.).  Have discussed with patient about inciting conditions which may exacerbate their disease.  We did discuss possible new therapies or de-escalation of therapy (if appropriate).  Asked patient if they were interested in pursuing pulmonary rehabilitation.  All questions answered  RTC 3 months  Patient instructed that they are to call if symptoms change or new issues develop prior to their next visit.           Relevant Medications    albuterol-ipratropium (DUO-NEB) 2.5 mg-0.5 mg/3 mL nebulizer solution    Other Relevant Orders    Six Minute Walk Test to qualify for Home Oxygen    Chronic respiratory failure with hypoxia    Will do walk test and see if he needs a high flow  concentrator            Other    Cigarette smoker    Has not smoked since mid May         LUIS (obstructive sleep apnea)    Continue present PAP therapy  Pt to call if they have any trouble with their machines  Discussed with pt about signs and symptoms to watch for  Will refill supplies as needed  May have consider transition to BiPAP or NIPPV due to his severe lung disease            Other Visit Diagnoses         COPD exacerbation        Relevant Medications    albuterol-ipratropium (DUO-NEB) 2.5 mg-0.5 mg/3 mL nebulizer solution                      Breezy Gastelum MD

## 2025-07-15 ENCOUNTER — HOSPITAL ENCOUNTER (OUTPATIENT)
Dept: PULMONOLOGY | Facility: HOSPITAL | Age: 75
Discharge: HOME | End: 2025-07-15
Attending: INTERNAL MEDICINE
Payer: MEDICARE

## 2025-07-15 DIAGNOSIS — J44.9 CHRONIC OBSTRUCTIVE PULMONARY DISEASE, UNSPECIFIED COPD TYPE: ICD-10-CM

## 2025-07-28 ENCOUNTER — TELEPHONE (OUTPATIENT)
Dept: PULMONOLOGY | Facility: CLINIC | Age: 75
End: 2025-07-28
Payer: MEDICARE

## 2025-07-28 DIAGNOSIS — J44.9 CHRONIC OBSTRUCTIVE PULMONARY DISEASE, UNSPECIFIED COPD TYPE: Primary | ICD-10-CM

## 2025-07-28 NOTE — TELEPHONE ENCOUNTER
Patient wife called an wants to see if we can tell iher the results of the 6 minute walk . She was saying that he is told that he will need a bigger oxygen tank

## 2025-08-05 NOTE — TELEPHONE ENCOUNTER
Lm for wife to tell results an let them knwo we did fax to [facility] for a 6 liter o2